# Patient Record
Sex: FEMALE | Race: WHITE | NOT HISPANIC OR LATINO | Employment: OTHER | URBAN - METROPOLITAN AREA
[De-identification: names, ages, dates, MRNs, and addresses within clinical notes are randomized per-mention and may not be internally consistent; named-entity substitution may affect disease eponyms.]

---

## 2023-02-09 ENCOUNTER — TELEPHONE (OUTPATIENT)
Dept: FAMILY MEDICINE CLINIC | Facility: CLINIC | Age: 63
End: 2023-02-09

## 2023-02-09 NOTE — TELEPHONE ENCOUNTER
Caller: SABIHA MCWILLIAMS    Best call back number: [phone]    Who is your current provider:N/A    Who would you like your new provider to be: FADI WOOD,  SABIHA OVIEDOCOMB SEES FADI MRN [id number]    What are your reasons for transferring care: DOES NOT HAVE A PCP    OKAY TO ACCEPT AS A NEW PATIENT?

## 2023-02-14 ENCOUNTER — PATIENT ROUNDING (BHMG ONLY) (OUTPATIENT)
Dept: FAMILY MEDICINE CLINIC | Facility: CLINIC | Age: 63
End: 2023-02-14
Payer: MEDICARE

## 2023-02-14 ENCOUNTER — OFFICE VISIT (OUTPATIENT)
Dept: FAMILY MEDICINE CLINIC | Facility: CLINIC | Age: 63
End: 2023-02-14
Payer: MEDICARE

## 2023-02-14 VITALS
WEIGHT: 197 LBS | BODY MASS INDEX: 29.18 KG/M2 | DIASTOLIC BLOOD PRESSURE: 82 MMHG | TEMPERATURE: 98.6 F | HEART RATE: 66 BPM | SYSTOLIC BLOOD PRESSURE: 140 MMHG | OXYGEN SATURATION: 96 % | HEIGHT: 69 IN

## 2023-02-14 DIAGNOSIS — Z12.11 COLON CANCER SCREENING: ICD-10-CM

## 2023-02-14 DIAGNOSIS — Z01.810 ENCOUNTER FOR PREPROCEDURAL CARDIOVASCULAR EXAMINATION: ICD-10-CM

## 2023-02-14 DIAGNOSIS — Z72.0 TOBACCO USE: Primary | ICD-10-CM

## 2023-02-14 DIAGNOSIS — R01.1 HEART MURMUR: ICD-10-CM

## 2023-02-14 DIAGNOSIS — F41.9 ANXIETY: ICD-10-CM

## 2023-02-14 DIAGNOSIS — Z13.9 ENCOUNTER FOR HEALTH-RELATED SCREENING: ICD-10-CM

## 2023-02-14 PROCEDURE — 99386 PREV VISIT NEW AGE 40-64: CPT | Performed by: NURSE PRACTITIONER

## 2023-02-14 NOTE — PROGRESS NOTES
February 14, 2023    Hello, may I speak with Tory Alston?    My name is Chelsey      I am  with [facility]  [address] [place] [address].    Before we get started may I verify your date of birth? 1960    I am calling to officially welcome you to our practice and ask about your recent visit. Is this a good time to talk? yes    Tell me about your visit with us. What things went well?  Very pleased with my visit. Margarita is a very nice lady.       We're always looking for ways to make our patients' experiences even better. Do you have recommendations on ways we may improve?  no    Overall were you satisfied with your first visit to our practice? Yes, absolutely!       I appreciate you taking the time to speak with me today. Is there anything else I can do for you? no      Thank you, and have a great day.
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
## 2023-02-14 NOTE — PATIENT INSTRUCTIONS
Mammogram  Cologuard this should come to your home  Pap should be scheduled.   Get us dates of covid   Shingles and tetanus vaccines are needed.   Follow up on lab results  Stop smoking

## 2023-02-15 ENCOUNTER — HOSPITAL ENCOUNTER (OUTPATIENT)
Dept: CARDIOLOGY | Facility: HOSPITAL | Age: 63
Discharge: HOME | End: 2023-02-15
Payer: MEDICARE

## 2023-02-15 ENCOUNTER — LAB (OUTPATIENT)
Dept: LAB | Facility: HOSPITAL | Age: 63
End: 2023-02-15
Payer: MEDICARE

## 2023-02-15 DIAGNOSIS — Z13.9 ENCOUNTER FOR HEALTH-RELATED SCREENING: ICD-10-CM

## 2023-02-15 DIAGNOSIS — R01.1 HEART MURMUR: ICD-10-CM

## 2023-02-15 DIAGNOSIS — Z01.810 ENCOUNTER FOR PREPROCEDURAL CARDIOVASCULAR EXAMINATION: ICD-10-CM

## 2023-02-15 DIAGNOSIS — F41.9 ANXIETY: ICD-10-CM

## 2023-02-15 LAB
ALBUMIN SERPL-MCNC: 4.4 G/DL (ref 3.5–5.2)
ALBUMIN/GLOB SERPL: 1.7 G/DL
ALP SERPL-CCNC: 78 U/L (ref 39–117)
ALT SERPL W P-5'-P-CCNC: 16 U/L (ref 1–33)
ANION GAP SERPL CALCULATED.3IONS-SCNC: 8.7 MMOL/L (ref 5–15)
AST SERPL-CCNC: 22 U/L (ref 1–32)
BASOPHILS # BLD AUTO: 0.09 10*3/MM3 (ref 0–0.2)
BASOPHILS NFR BLD AUTO: 1.4 % (ref 0–1.5)
BILIRUB SERPL-MCNC: 0.3 MG/DL (ref 0–1.2)
BUN SERPL-MCNC: 16 MG/DL (ref 8–23)
BUN/CREAT SERPL: 21.3 (ref 7–25)
CALCIUM SPEC-SCNC: 9.6 MG/DL (ref 8.6–10.5)
CHLORIDE SERPL-SCNC: 103 MMOL/L (ref 98–107)
CHOLEST SERPL-MCNC: 209 MG/DL (ref 0–200)
CO2 SERPL-SCNC: 25.3 MMOL/L (ref 22–29)
CREAT SERPL-MCNC: 0.75 MG/DL (ref 0.57–1)
DEPRECATED RDW RBC AUTO: 42.2 FL (ref 37–54)
EGFRCR SERPLBLD CKD-EPI 2021: 89 ML/MIN/1.73
EOSINOPHIL # BLD AUTO: 0.22 10*3/MM3 (ref 0–0.4)
EOSINOPHIL NFR BLD AUTO: 3.3 % (ref 0.3–6.2)
ERYTHROCYTE [DISTWIDTH] IN BLOOD BY AUTOMATED COUNT: 12.6 % (ref 12.3–15.4)
GLOBULIN UR ELPH-MCNC: 2.6 GM/DL
GLUCOSE SERPL-MCNC: 82 MG/DL (ref 65–99)
HCT VFR BLD AUTO: 40.8 % (ref 34–46.6)
HDLC SERPL-MCNC: 81 MG/DL (ref 40–60)
HGB BLD-MCNC: 13.6 G/DL (ref 12–15.9)
IMM GRANULOCYTES # BLD AUTO: 0.02 10*3/MM3 (ref 0–0.05)
IMM GRANULOCYTES NFR BLD AUTO: 0.3 % (ref 0–0.5)
LDLC SERPL CALC-MCNC: 115 MG/DL (ref 0–100)
LDLC/HDLC SERPL: 1.4 {RATIO}
LYMPHOCYTES # BLD AUTO: 2.17 10*3/MM3 (ref 0.7–3.1)
LYMPHOCYTES NFR BLD AUTO: 32.7 % (ref 19.6–45.3)
MCH RBC QN AUTO: 30.7 PG (ref 26.6–33)
MCHC RBC AUTO-ENTMCNC: 33.3 G/DL (ref 31.5–35.7)
MCV RBC AUTO: 92.1 FL (ref 79–97)
MONOCYTES # BLD AUTO: 0.49 10*3/MM3 (ref 0.1–0.9)
MONOCYTES NFR BLD AUTO: 7.4 % (ref 5–12)
NEUTROPHILS NFR BLD AUTO: 3.65 10*3/MM3 (ref 1.7–7)
NEUTROPHILS NFR BLD AUTO: 54.9 % (ref 42.7–76)
NRBC BLD AUTO-RTO: 0 /100 WBC (ref 0–0.2)
PLATELET # BLD AUTO: 255 10*3/MM3 (ref 140–450)
PMV BLD AUTO: 11.7 FL (ref 6–12)
POTASSIUM SERPL-SCNC: 4.3 MMOL/L (ref 3.5–5.2)
PROT SERPL-MCNC: 7 G/DL (ref 6–8.5)
RBC # BLD AUTO: 4.43 10*6/MM3 (ref 3.77–5.28)
SODIUM SERPL-SCNC: 137 MMOL/L (ref 136–145)
T4 FREE SERPL-MCNC: 1.04 NG/DL (ref 0.93–1.7)
TRIGL SERPL-MCNC: 73 MG/DL (ref 0–150)
TSH SERPL DL<=0.05 MIU/L-ACNC: 1.61 UIU/ML (ref 0.27–4.2)
VLDLC SERPL-MCNC: 13 MG/DL (ref 5–40)
WBC NRBC COR # BLD: 6.64 10*3/MM3 (ref 3.4–10.8)

## 2023-02-15 PROCEDURE — 93005 ELECTROCARDIOGRAM TRACING: CPT | Performed by: NURSE PRACTITIONER

## 2023-02-15 PROCEDURE — 84439 ASSAY OF FREE THYROXINE: CPT

## 2023-02-15 PROCEDURE — 80053 COMPREHEN METABOLIC PANEL: CPT

## 2023-02-15 PROCEDURE — 36415 COLL VENOUS BLD VENIPUNCTURE: CPT

## 2023-02-15 PROCEDURE — 93010 ELECTROCARDIOGRAM REPORT: CPT | Performed by: INTERNAL MEDICINE

## 2023-02-15 PROCEDURE — 80061 LIPID PANEL: CPT

## 2023-02-15 PROCEDURE — 85025 COMPLETE CBC W/AUTO DIFF WBC: CPT

## 2023-02-15 PROCEDURE — 84443 ASSAY THYROID STIM HORMONE: CPT

## 2023-02-17 LAB — QT INTERVAL: 425 MS

## 2023-02-22 ENCOUNTER — HOSPITAL ENCOUNTER (OUTPATIENT)
Dept: RESPIRATORY THERAPY | Facility: HOSPITAL | Age: 63
Discharge: HOME | End: 2023-02-22
Admitting: NURSE PRACTITIONER
Payer: MEDICARE

## 2023-02-22 DIAGNOSIS — Z01.810 ENCOUNTER FOR PREPROCEDURAL CARDIOVASCULAR EXAMINATION: ICD-10-CM

## 2023-02-22 DIAGNOSIS — R01.1 HEART MURMUR: ICD-10-CM

## 2023-02-22 LAB
BH CV STRESS BP STAGE 1: NORMAL
BH CV STRESS BP STAGE 2: NORMAL
BH CV STRESS DURATION MIN STAGE 1: 3
BH CV STRESS DURATION MIN STAGE 2: 3
BH CV STRESS DURATION SEC STAGE 1: 0
BH CV STRESS DURATION SEC STAGE 2: 0
BH CV STRESS GRADE STAGE 1: 10
BH CV STRESS GRADE STAGE 2: 0
BH CV STRESS HR STAGE 1: 152
BH CV STRESS HR STAGE 2: 160
BH CV STRESS METS STAGE 1: 5
BH CV STRESS METS STAGE 2: 7.5
BH CV STRESS PROTOCOL 1: NORMAL
BH CV STRESS RECOVERY BP: NORMAL MMHG
BH CV STRESS RECOVERY HR: 109 BPM
BH CV STRESS SPEED STAGE 1: 1.7
BH CV STRESS SPEED STAGE 2: 1.7
BH CV STRESS STAGE 1: 1
BH CV STRESS STAGE 2: 2
MAXIMAL PREDICTED HEART RATE: 156 BPM
PERCENT MAX PREDICTED HR: 105.77 %
STRESS BASELINE BP: NORMAL MMHG
STRESS BASELINE HR: 95 BPM
STRESS PERCENT HR: 124 %
STRESS POST ESTIMATED WORKLOAD: 7 METS
STRESS POST EXERCISE DUR MIN: 6 MIN
STRESS POST PEAK BP: NORMAL MMHG
STRESS POST PEAK HR: 165 BPM
STRESS TARGET HR: 133 BPM

## 2023-02-22 PROCEDURE — 93017 CV STRESS TEST TRACING ONLY: CPT

## 2023-02-22 PROCEDURE — 93018 CV STRESS TEST I&R ONLY: CPT | Performed by: INTERNAL MEDICINE

## 2023-02-24 ENCOUNTER — OFFICE VISIT (OUTPATIENT)
Dept: FAMILY MEDICINE CLINIC | Facility: CLINIC | Age: 63
End: 2023-02-24
Payer: MEDICARE

## 2023-02-24 VITALS
WEIGHT: 190 LBS | HEART RATE: 71 BPM | OXYGEN SATURATION: 93 % | SYSTOLIC BLOOD PRESSURE: 112 MMHG | BODY MASS INDEX: 28.14 KG/M2 | HEIGHT: 69 IN | DIASTOLIC BLOOD PRESSURE: 79 MMHG | TEMPERATURE: 97.8 F

## 2023-02-24 DIAGNOSIS — B37.0 THRUSH: Primary | ICD-10-CM

## 2023-02-24 PROCEDURE — 99213 OFFICE O/P EST LOW 20 MIN: CPT | Performed by: NURSE PRACTITIONER

## 2023-02-24 RX ORDER — CLOTRIMAZOLE 10 MG/1
10 LOZENGE ORAL; TOPICAL
Qty: 35 TABLET | Refills: 0 | Status: SHIPPED | OUTPATIENT
Start: 2023-02-24

## 2023-02-24 NOTE — PROGRESS NOTES
Subjective        Tory Alston is a 63 y.o. female.     Chief Complaint   Patient presents with   • Thrush     Coating on tongue       History of Present Illness  Patient is here for management of what she thinks is thrush started thurs evening. linda has this.   She is drinking from a bottle with cap that may be the problem  Is scraping her tongue. This helps no medicaitons.           The following portions of the patient's history were reviewed and updated as appropriate: allergies, current medications, past family history, past medical history, past social history, past surgical history and problem list.      Current Outpatient Medications:   •  clotrimazole (MYCELEX) 10 MG bethany, Take 1 tablet by mouth 5 (Five) Times a Day., Disp: 35 tablet, Rfl: 0    Recent Results (from the past 4032 hour(s))   Comprehensive Metabolic Panel    Collection Time: 02/15/23  9:01 AM    Specimen: Blood   Result Value Ref Range    Glucose 82 65 - 99 mg/dL    BUN 16 8 - 23 mg/dL    Creatinine 0.75 0.57 - 1.00 mg/dL    Sodium 137 136 - 145 mmol/L    Potassium 4.3 3.5 - 5.2 mmol/L    Chloride 103 98 - 107 mmol/L    CO2 25.3 22.0 - 29.0 mmol/L    Calcium 9.6 8.6 - 10.5 mg/dL    Total Protein 7.0 6.0 - 8.5 g/dL    Albumin 4.4 3.5 - 5.2 g/dL    ALT (SGPT) 16 1 - 33 U/L    AST (SGOT) 22 1 - 32 U/L    Alkaline Phosphatase 78 39 - 117 U/L    Total Bilirubin 0.3 0.0 - 1.2 mg/dL    Globulin 2.6 gm/dL    A/G Ratio 1.7 g/dL    BUN/Creatinine Ratio 21.3 7.0 - 25.0    Anion Gap 8.7 5.0 - 15.0 mmol/L    eGFR 89.0 >60.0 mL/min/1.73   Lipid Panel    Collection Time: 02/15/23  9:01 AM    Specimen: Blood   Result Value Ref Range    Total Cholesterol 209 (H) 0 - 200 mg/dL    Triglycerides 73 0 - 150 mg/dL    HDL Cholesterol 81 (H) 40 - 60 mg/dL    LDL Cholesterol  115 (H) 0 - 100 mg/dL    VLDL Cholesterol 13 5 - 40 mg/dL    LDL/HDL Ratio 1.40    TSH    Collection Time: 02/15/23  9:01 AM    Specimen: Blood   Result Value Ref Range    TSH 1.610  0.270 - 4.200 uIU/mL   T4, free    Collection Time: 02/15/23  9:01 AM    Specimen: Blood   Result Value Ref Range    Free T4 1.04 0.93 - 1.70 ng/dL   CBC Auto Differential    Collection Time: 02/15/23  9:01 AM    Specimen: Blood   Result Value Ref Range    WBC 6.64 3.40 - 10.80 10*3/mm3    RBC 4.43 3.77 - 5.28 10*6/mm3    Hemoglobin 13.6 12.0 - 15.9 g/dL    Hematocrit 40.8 34.0 - 46.6 %    MCV 92.1 79.0 - 97.0 fL    MCH 30.7 26.6 - 33.0 pg    MCHC 33.3 31.5 - 35.7 g/dL    RDW 12.6 12.3 - 15.4 %    RDW-SD 42.2 37.0 - 54.0 fl    MPV 11.7 6.0 - 12.0 fL    Platelets 255 140 - 450 10*3/mm3    Neutrophil % 54.9 42.7 - 76.0 %    Lymphocyte % 32.7 19.6 - 45.3 %    Monocyte % 7.4 5.0 - 12.0 %    Eosinophil % 3.3 0.3 - 6.2 %    Basophil % 1.4 0.0 - 1.5 %    Immature Grans % 0.3 0.0 - 0.5 %    Neutrophils, Absolute 3.65 1.70 - 7.00 10*3/mm3    Lymphocytes, Absolute 2.17 0.70 - 3.10 10*3/mm3    Monocytes, Absolute 0.49 0.10 - 0.90 10*3/mm3    Eosinophils, Absolute 0.22 0.00 - 0.40 10*3/mm3    Basophils, Absolute 0.09 0.00 - 0.20 10*3/mm3    Immature Grans, Absolute 0.02 0.00 - 0.05 10*3/mm3    nRBC 0.0 0.0 - 0.2 /100 WBC   ECG 12 Lead    Collection Time: 02/15/23  9:18 AM   Result Value Ref Range    QT Interval 425 ms   Treadmill Stress Test    Collection Time: 02/22/23 10:39 AM   Result Value Ref Range    Target HR (85%) 133 bpm    Max. Pred. HR (100%) 156 bpm     CV STRESS PROTOCOL 1 Kai     Stage 1 1     HR Stage 1 152     BP Stage 1 160/90     Duration Min Stage 1 3     Duration Sec Stage 1 0     Grade Stage 1 10     Speed Stage 1 1.7     BH CV STRESS METS STAGE 1 5     Stage 2 2     HR Stage 2 160     BP Stage 2 175/88     Duration Min Stage 2 3     Duration Sec Stage 2 0     Grade Stage 2 0     Speed Stage 2 1.7     BH CV STRESS METS STAGE 2 7.5     Baseline HR 95 bpm    Baseline /75 mmHg    Peak  bpm    Percent Max Pred .77 %    Percent Target  %    Peak /88 mmHg    Exercise duration  "(min) 6 min    Estimated workload 7.0 METS    Recovery  bpm    Recovery /76 mmHg         Review of Systems    Objective     /79 (BP Location: Left arm, Patient Position: Sitting, Cuff Size: Adult)   Pulse 71   Temp 97.8 °F (36.6 °C) (Infrared)   Ht 175.3 cm (69\")   Wt 86.2 kg (190 lb)   SpO2 93%   BMI 28.06 kg/m²     Physical Exam  Vitals and nursing note reviewed.   Constitutional:       Appearance: Normal appearance.   HENT:      Mouth/Throat:      Comments: White coating to tongue.   Cardiovascular:      Rate and Rhythm: Regular rhythm.   Neurological:      General: No focal deficit present.      Mental Status: She is alert.         Result Review :                Assessment & Plan    Diagnoses and all orders for this visit:    1. Thrush (Primary)  Comments:  RX sent to pharmacy    Other orders  -     clotrimazole (MYCELEX) 10 MG bethany; Take 1 tablet by mouth 5 (Five) Times a Day.  Dispense: 35 tablet; Refill: 0      There are no Patient Instructions on file for this visit.    Follow Up   No follow-ups on file.    Patient was given instructions and counseling regarding her condition or for health maintenance advice. Please see specific information pulled into the AVS if appropriate.     Margarita Erazo, APRN    02/24/23      "

## 2023-03-06 DIAGNOSIS — R19.5 POSITIVE COLORECTAL CANCER SCREENING USING COLOGUARD TEST: Primary | ICD-10-CM

## 2023-03-10 ENCOUNTER — HOSPITAL ENCOUNTER (OUTPATIENT)
Dept: CARDIOLOGY | Facility: HOSPITAL | Age: 63
Discharge: HOME | End: 2023-03-10
Admitting: NURSE PRACTITIONER
Payer: MEDICARE

## 2023-03-10 DIAGNOSIS — R01.1 HEART MURMUR: ICD-10-CM

## 2023-03-10 PROCEDURE — 93306 TTE W/DOPPLER COMPLETE: CPT | Performed by: INTERNAL MEDICINE

## 2023-03-10 PROCEDURE — 93306 TTE W/DOPPLER COMPLETE: CPT

## 2023-03-13 LAB
BH CV ECHO MEAS - ACS: 2 CM
BH CV ECHO MEAS - AO MAX PG: 8.2 MMHG
BH CV ECHO MEAS - AO MEAN PG: 4 MMHG
BH CV ECHO MEAS - AO V2 MAX: 143 CM/SEC
BH CV ECHO MEAS - AO V2 VTI: 26.7 CM
BH CV ECHO MEAS - AVA(I,D): 3 CM2
BH CV ECHO MEAS - EDV(CUBED): 79.5 ML
BH CV ECHO MEAS - EDV(MOD-SP4): 120 ML
BH CV ECHO MEAS - EF(MOD-BP): 69 %
BH CV ECHO MEAS - EF(MOD-SP4): 69.3 %
BH CV ECHO MEAS - EF_3D-VOL: 71 %
BH CV ECHO MEAS - ESV(CUBED): 19.7 ML
BH CV ECHO MEAS - ESV(MOD-SP4): 36.9 ML
BH CV ECHO MEAS - FS: 37.2 %
BH CV ECHO MEAS - IVS/LVPW: 1 CM
BH CV ECHO MEAS - IVSD: 0.9 CM
BH CV ECHO MEAS - LA DIMENSION: 3.2 CM
BH CV ECHO MEAS - LAT PEAK E' VEL: 12.5 CM/SEC
BH CV ECHO MEAS - LV DIASTOLIC VOL/BSA (35-75): 59.4 CM2
BH CV ECHO MEAS - LV MASS(C)D: 123.3 GRAMS
BH CV ECHO MEAS - LV MAX PG: 7.5 MMHG
BH CV ECHO MEAS - LV MEAN PG: 4 MMHG
BH CV ECHO MEAS - LV SYSTOLIC VOL/BSA (12-30): 18.3 CM2
BH CV ECHO MEAS - LV V1 MAX: 137 CM/SEC
BH CV ECHO MEAS - LV V1 VTI: 25.1 CM
BH CV ECHO MEAS - LVIDD: 4.3 CM
BH CV ECHO MEAS - LVIDS: 2.7 CM
BH CV ECHO MEAS - LVOT AREA: 3.1 CM2
BH CV ECHO MEAS - LVOT DIAM: 2 CM
BH CV ECHO MEAS - LVPWD: 0.9 CM
BH CV ECHO MEAS - MED PEAK E' VEL: 9.7 CM/SEC
BH CV ECHO MEAS - MV A MAX VEL: 81.5 CM/SEC
BH CV ECHO MEAS - MV DEC SLOPE: 163 CM/SEC2
BH CV ECHO MEAS - MV DEC TIME: 0.2 MSEC
BH CV ECHO MEAS - MV E MAX VEL: 68.2 CM/SEC
BH CV ECHO MEAS - MV E/A: 0.84
BH CV ECHO MEAS - MV MAX PG: 3.4 MMHG
BH CV ECHO MEAS - MV MEAN PG: 2 MMHG
BH CV ECHO MEAS - MV P1/2T: 153.6 MSEC
BH CV ECHO MEAS - MV V2 VTI: 30 CM
BH CV ECHO MEAS - MVA(P1/2T): 1.43 CM2
BH CV ECHO MEAS - MVA(VTI): 2.6 CM2
BH CV ECHO MEAS - PA ACC TIME: 0.17 SEC
BH CV ECHO MEAS - PA PR(ACCEL): 0.7 MMHG
BH CV ECHO MEAS - PA V2 MAX: 101.2 CM/SEC
BH CV ECHO MEAS - RV MAX PG: 2.07 MMHG
BH CV ECHO MEAS - RV V1 MAX: 71.9 CM/SEC
BH CV ECHO MEAS - RV V1 VTI: 14.1 CM
BH CV ECHO MEAS - RVDD: 3.9 CM
BH CV ECHO MEAS - SI(MOD-SP4): 41.1 ML/M2
BH CV ECHO MEAS - SV(LVOT): 78.9 ML
BH CV ECHO MEAS - SV(MOD-SP4): 83.1 ML
BH CV ECHO MEAS - TAPSE (>1.6): 2 CM
BH CV ECHO MEAS - TR MAX PG: 18.1 MMHG
BH CV ECHO MEAS - TR MAX VEL: 213 CM/SEC
BH CV ECHO MEASUREMENTS AVERAGE E/E' RATIO: 6.14
BH CV XLRA - TDI S': 12.5 CM/SEC
LEFT ATRIUM VOLUME INDEX: 16.3 ML/M2
MAXIMAL PREDICTED HEART RATE: 156 BPM
SINUS: 3 CM
STJ: 2.1 CM
STRESS TARGET HR: 133 BPM

## 2023-03-14 ENCOUNTER — OFFICE VISIT (OUTPATIENT)
Dept: FAMILY MEDICINE CLINIC | Facility: CLINIC | Age: 63
End: 2023-03-14
Payer: MEDICARE

## 2023-03-14 VITALS
SYSTOLIC BLOOD PRESSURE: 130 MMHG | DIASTOLIC BLOOD PRESSURE: 72 MMHG | HEIGHT: 69 IN | TEMPERATURE: 96.6 F | OXYGEN SATURATION: 96 % | WEIGHT: 186.8 LBS | BODY MASS INDEX: 27.67 KG/M2 | RESPIRATION RATE: 20 BRPM | HEART RATE: 74 BPM

## 2023-03-14 DIAGNOSIS — Z72.0 TOBACCO USE: Chronic | ICD-10-CM

## 2023-03-14 DIAGNOSIS — E78.2 MIXED HYPERLIPIDEMIA: Primary | ICD-10-CM

## 2023-03-14 DIAGNOSIS — R01.1 HEART MURMUR: Chronic | ICD-10-CM

## 2023-03-14 DIAGNOSIS — R19.5 POSITIVE COLORECTAL CANCER SCREENING USING COLOGUARD TEST: Chronic | ICD-10-CM

## 2023-03-14 PROBLEM — B37.0 THRUSH: Status: RESOLVED | Noted: 2023-02-24 | Resolved: 2023-03-14

## 2023-03-14 PROBLEM — D36.9 PAPILLOMA: Status: ACTIVE | Noted: 2023-03-03

## 2023-03-14 PROCEDURE — 99214 OFFICE O/P EST MOD 30 MIN: CPT | Performed by: NURSE PRACTITIONER

## 2023-03-14 PROCEDURE — 90677 PCV20 VACCINE IM: CPT | Performed by: NURSE PRACTITIONER

## 2023-03-14 PROCEDURE — 1159F MED LIST DOCD IN RCRD: CPT | Performed by: NURSE PRACTITIONER

## 2023-03-14 PROCEDURE — G0009 ADMIN PNEUMOCOCCAL VACCINE: HCPCS | Performed by: NURSE PRACTITIONER

## 2023-03-14 PROCEDURE — 1160F RVW MEDS BY RX/DR IN RCRD: CPT | Performed by: NURSE PRACTITIONER

## 2023-03-14 RX ORDER — ATORVASTATIN CALCIUM 40 MG/1
40 TABLET, FILM COATED ORAL DAILY
Qty: 90 TABLET | Refills: 0 | Status: SHIPPED | OUTPATIENT
Start: 2023-03-14

## 2023-03-14 NOTE — PROGRESS NOTES
Subjective        Tory Alston is a 63 y.o. female.     Chief Complaint   Patient presents with   • Hypertension   • CARDIAC Testing follow up       History of Present Illness  Patient is here for management of her hypertension, hyperlipidemia, heart murmur.     Heart murmur; no completed her stress test : 2/22/2023  Good exercise tolerance no evidence for myocardial ischemia.  Echo EF left ventricular 69%  Left ventricular diastolic function consistent with grade 1 impaired relaxation.   No significant valvular abnormalities.    Tobacco use she will need pneumonia vaccine.     ACSD: risk calculator 9.5% risk score: recommended she take atorvastatin start 40 mg daily.     Positive cologuard she is filling out paper for [facility] to perform colon cancer screening           The following portions of the patient's history were reviewed and updated as appropriate: allergies, current medications, past family history, past medical history, past social history, past surgical history and problem list.      Current Outpatient Medications:   •  clotrimazole (MYCELEX) 10 MG bethany, Take 1 tablet by mouth 5 (Five) Times a Day., Disp: 35 tablet, Rfl: 0  •  atorvastatin (Lipitor) 40 MG tablet, Take 1 tablet by mouth Daily., Disp: 90 tablet, Rfl: 0    Recent Results (from the past 4032 hour(s))   Comprehensive Metabolic Panel    Collection Time: 02/15/23  9:01 AM    Specimen: Blood   Result Value Ref Range    Glucose 82 65 - 99 mg/dL    BUN 16 8 - 23 mg/dL    Creatinine 0.75 0.57 - 1.00 mg/dL    Sodium 137 136 - 145 mmol/L    Potassium 4.3 3.5 - 5.2 mmol/L    Chloride 103 98 - 107 mmol/L    CO2 25.3 22.0 - 29.0 mmol/L    Calcium 9.6 8.6 - 10.5 mg/dL    Total Protein 7.0 6.0 - 8.5 g/dL    Albumin 4.4 3.5 - 5.2 g/dL    ALT (SGPT) 16 1 - 33 U/L    AST (SGOT) 22 1 - 32 U/L    Alkaline Phosphatase 78 39 - 117 U/L    Total Bilirubin 0.3 0.0 - 1.2 mg/dL    Globulin 2.6 gm/dL    A/G Ratio 1.7 g/dL    BUN/Creatinine Ratio 21.3 7.0 - 25.0     Anion Gap 8.7 5.0 - 15.0 mmol/L    eGFR 89.0 >60.0 mL/min/1.73   Lipid Panel    Collection Time: 02/15/23  9:01 AM    Specimen: Blood   Result Value Ref Range    Total Cholesterol 209 (H) 0 - 200 mg/dL    Triglycerides 73 0 - 150 mg/dL    HDL Cholesterol 81 (H) 40 - 60 mg/dL    LDL Cholesterol  115 (H) 0 - 100 mg/dL    VLDL Cholesterol 13 5 - 40 mg/dL    LDL/HDL Ratio 1.40    TSH    Collection Time: 02/15/23  9:01 AM    Specimen: Blood   Result Value Ref Range    TSH 1.610 0.270 - 4.200 uIU/mL   T4, free    Collection Time: 02/15/23  9:01 AM    Specimen: Blood   Result Value Ref Range    Free T4 1.04 0.93 - 1.70 ng/dL   CBC Auto Differential    Collection Time: 02/15/23  9:01 AM    Specimen: Blood   Result Value Ref Range    WBC 6.64 3.40 - 10.80 10*3/mm3    RBC 4.43 3.77 - 5.28 10*6/mm3    Hemoglobin 13.6 12.0 - 15.9 g/dL    Hematocrit 40.8 34.0 - 46.6 %    MCV 92.1 79.0 - 97.0 fL    MCH 30.7 26.6 - 33.0 pg    MCHC 33.3 31.5 - 35.7 g/dL    RDW 12.6 12.3 - 15.4 %    RDW-SD 42.2 37.0 - 54.0 fl    MPV 11.7 6.0 - 12.0 fL    Platelets 255 140 - 450 10*3/mm3    Neutrophil % 54.9 42.7 - 76.0 %    Lymphocyte % 32.7 19.6 - 45.3 %    Monocyte % 7.4 5.0 - 12.0 %    Eosinophil % 3.3 0.3 - 6.2 %    Basophil % 1.4 0.0 - 1.5 %    Immature Grans % 0.3 0.0 - 0.5 %    Neutrophils, Absolute 3.65 1.70 - 7.00 10*3/mm3    Lymphocytes, Absolute 2.17 0.70 - 3.10 10*3/mm3    Monocytes, Absolute 0.49 0.10 - 0.90 10*3/mm3    Eosinophils, Absolute 0.22 0.00 - 0.40 10*3/mm3    Basophils, Absolute 0.09 0.00 - 0.20 10*3/mm3    Immature Grans, Absolute 0.02 0.00 - 0.05 10*3/mm3    nRBC 0.0 0.0 - 0.2 /100 WBC   ECG 12 Lead    Collection Time: 02/15/23  9:18 AM   Result Value Ref Range    QT Interval 425 ms   Treadmill Stress Test    Collection Time: 02/22/23 10:39 AM   Result Value Ref Range    Target HR (85%) 133 bpm    Max. Pred. HR (100%) 156 bpm     CV STRESS PROTOCOL 1 Kai     Stage 1 1     HR Stage 1 152     BP Stage 1 160/90      Duration Min Stage 1 3     Duration Sec Stage 1 0     Grade Stage 1 10     Speed Stage 1 1.7     BH CV STRESS METS STAGE 1 5     Stage 2 2     HR Stage 2 160     BP Stage 2 175/88     Duration Min Stage 2 3     Duration Sec Stage 2 0     Grade Stage 2 0     Speed Stage 2 1.7     BH CV STRESS METS STAGE 2 7.5     Baseline HR 95 bpm    Baseline /75 mmHg    Peak  bpm    Percent Max Pred .77 %    Percent Target  %    Peak /88 mmHg    Exercise duration (min) 6 min    Estimated workload 7.0 METS    Recovery  bpm    Recovery /76 mmHg   Cologuard - Stool, Per Rectum    Collection Time: 02/27/23  7:24 AM    Specimen: Per Rectum; Stool   Result Value Ref Range    Cologuard Positive (A) Negative   Adult Transthoracic Echo Complete W/ Cont if Necessary Per Protocol    Collection Time: 03/10/23  4:50 PM   Result Value Ref Range    Target HR (85%) 133 bpm    Max. Pred. HR (100%) 156 bpm    LVIDd 4.3 cm    LVIDs 2.7 cm    IVSd 0.90 cm    LVPWd 0.90 cm    FS 37.2 %    IVS/LVPW 1.00 cm    ESV(cubed) 19.7 ml    LV Sys Vol (BSA corrected) 18.3 cm2    EDV(cubed) 79.5 ml    LV Conley Vol (BSA corrected) 59.4 cm2    LVOT area 3.1 cm2    LV mass(C)d 123.3 grams    LVOT diam 2.00 cm    EDV(MOD-sp4) 120.0 ml    ESV(MOD-sp4) 36.9 ml    SV(MOD-sp4) 83.1 ml    SI(MOD-sp4) 41.1 ml/m2    EF(MOD-sp4) 69.3 %    MV E max erickson 68.2 cm/sec    MV A max erickson 81.5 cm/sec    MV dec time 0.20 msec    MV E/A 0.84     LA ESV Index (BP) 16.3 ml/m2    Med Peak E' Erickson 9.7 cm/sec    Lat Peak E' Erickson 12.5 cm/sec    Avg E/e' ratio 6.14     SV(LVOT) 78.9 ml    RVIDd 3.9 cm    TAPSE (>1.6) 2.00 cm    RV S' 12.5 cm/sec    LA dimension (2D)  3.2 cm    LV V1 max 137.0 cm/sec    LV V1 max PG 7.5 mmHg    LV V1 mean PG 4.0 mmHg    LV V1 VTI 25.1 cm    Ao pk erickson 143.0 cm/sec    Ao max PG 8.2 mmHg    Ao mean PG 4.0 mmHg    Ao V2 VTI 26.7 cm    SAHIL(I,D) 3.0 cm2    MV max PG 3.4 mmHg    MV mean PG 2.00 mmHg    MV V2 VTI 30.0 cm    MV  "P1/2t 153.6 msec    MVA(P1/2t) 1.43 cm2    MVA(VTI) 2.6 cm2    MV dec slope 163.0 cm/sec2    TR max chetan 213.0 cm/sec    TR max PG 18.1 mmHg    RV V1 max PG 2.07 mmHg    RV V1 max 71.9 cm/sec    RV V1 VTI 14.1 cm    PA V2 max 101.2 cm/sec    PA acc time 0.17 sec    PA pr(Accel) 0.70 mmHg    ACS 2.00 cm    Sinus 3.0 cm    STJ 2.10 cm    EF(MOD-bp) 69.0 %    EF_3D-VOL 71.0 %         Review of Systems    Objective     /72   Pulse 74   Temp 96.6 °F (35.9 °C) (Infrared)   Resp 20   Ht 175.3 cm (69\")   Wt 84.7 kg (186 lb 12.8 oz)   SpO2 96%   BMI 27.59 kg/m²     Physical Exam  Vitals and nursing note reviewed.   HENT:      Head: Normocephalic.      Right Ear: External ear normal.      Left Ear: External ear normal.      Nose: Nose normal.      Mouth/Throat:      Mouth: Mucous membranes are moist.   Eyes:      Pupils: Pupils are equal, round, and reactive to light.   Cardiovascular:      Rate and Rhythm: Normal rate and regular rhythm.      Heart sounds: Murmur heard.   Pulmonary:      Effort: Pulmonary effort is normal.      Breath sounds: Normal breath sounds.   Abdominal:      Palpations: Abdomen is soft.   Musculoskeletal:      Cervical back: Neck supple.   Skin:     General: Skin is warm.   Neurological:      General: No focal deficit present.      Mental Status: She is alert.   Psychiatric:         Mood and Affect: Mood normal.         Thought Content: Thought content normal.         Result Review :                Assessment & Plan    Diagnoses and all orders for this visit:    1. Mixed hyperlipidemia (Primary)  Comments:  start statin    2. Tobacco use  Comments:  recommend she stop smoking she is cutting back    3. Positive colorectal cancer screening using Cologuard test  Comments:  working on paperwork for colonoscopy    4. Heart murmur  Comments:  testing completed reviewed in office today.     Other orders  -     atorvastatin (Lipitor) 40 MG tablet; Take 1 tablet by mouth Daily.  Dispense: 90 " tablet; Refill: 0  -     Pneumococcal Conjugate Vaccine 20-Valent (PCV20)      Patient Instructions   Monitor blood pressure  Stop smoking  Eat healthy exercise  Start your statin       Follow Up   Return in about 6 months (around 9/14/2023).    Patient was given instructions and counseling regarding her condition or for health maintenance advice. Please see specific information pulled into the AVS if appropriate.     Margarita Erazo, APRN    03/14/23      Answers for HPI/ROS submitted by the patient on 3/7/2023  Please describe your symptoms.: Test results  Have you had these symptoms before?: No  How long have you been having these symptoms?: Greater than 2 weeks  Please list any medications you are currently taking for this condition.: None  Please describe any probable cause for these symptoms. : Test results  What is the primary reason for your visit?: Other

## 2023-06-10 RX ORDER — ATORVASTATIN CALCIUM 40 MG/1
TABLET, FILM COATED ORAL
Qty: 90 TABLET | Refills: 0 | Status: SHIPPED | OUTPATIENT
Start: 2023-06-10

## 2023-06-29 ENCOUNTER — OFFICE (OUTPATIENT)
Dept: URBAN - METROPOLITAN AREA PATHOLOGY 4 | Facility: PATHOLOGY | Age: 63
End: 2023-06-29

## 2023-06-29 ENCOUNTER — OFFICE (OUTPATIENT)
Dept: URBAN - METROPOLITAN AREA PATHOLOGY 4 | Facility: PATHOLOGY | Age: 63
End: 2023-06-29
Payer: COMMERCIAL

## 2023-06-29 ENCOUNTER — ON CAMPUS - OUTPATIENT (OUTPATIENT)
Dept: URBAN - METROPOLITAN AREA HOSPITAL 2 | Facility: HOSPITAL | Age: 63
End: 2023-06-29
Payer: MEDICARE

## 2023-06-29 VITALS
HEART RATE: 71 BPM | OXYGEN SATURATION: 98 % | HEART RATE: 67 BPM | RESPIRATION RATE: 16 BRPM | DIASTOLIC BLOOD PRESSURE: 65 MMHG | DIASTOLIC BLOOD PRESSURE: 86 MMHG | SYSTOLIC BLOOD PRESSURE: 115 MMHG | OXYGEN SATURATION: 99 % | HEART RATE: 74 BPM | RESPIRATION RATE: 17 BRPM | OXYGEN SATURATION: 96 % | RESPIRATION RATE: 14 BRPM | RESPIRATION RATE: 12 BRPM | HEIGHT: 69 IN | DIASTOLIC BLOOD PRESSURE: 69 MMHG | SYSTOLIC BLOOD PRESSURE: 143 MMHG | OXYGEN SATURATION: 100 % | DIASTOLIC BLOOD PRESSURE: 72 MMHG | SYSTOLIC BLOOD PRESSURE: 117 MMHG | DIASTOLIC BLOOD PRESSURE: 73 MMHG | SYSTOLIC BLOOD PRESSURE: 120 MMHG | SYSTOLIC BLOOD PRESSURE: 136 MMHG | SYSTOLIC BLOOD PRESSURE: 108 MMHG | HEART RATE: 89 BPM | SYSTOLIC BLOOD PRESSURE: 137 MMHG | TEMPERATURE: 97.8 F | RESPIRATION RATE: 13 BRPM | WEIGHT: 183 LBS | SYSTOLIC BLOOD PRESSURE: 99 MMHG | DIASTOLIC BLOOD PRESSURE: 93 MMHG | DIASTOLIC BLOOD PRESSURE: 83 MMHG | DIASTOLIC BLOOD PRESSURE: 89 MMHG | HEART RATE: 76 BPM | RESPIRATION RATE: 18 BRPM

## 2023-06-29 DIAGNOSIS — D12.0 BENIGN NEOPLASM OF CECUM: ICD-10-CM

## 2023-06-29 DIAGNOSIS — Z12.11 ENCOUNTER FOR SCREENING FOR MALIGNANT NEOPLASM OF COLON: ICD-10-CM

## 2023-06-29 DIAGNOSIS — K62.1 RECTAL POLYP: ICD-10-CM

## 2023-06-29 DIAGNOSIS — K64.1 SECOND DEGREE HEMORRHOIDS: ICD-10-CM

## 2023-06-29 DIAGNOSIS — R19.5 OTHER FECAL ABNORMALITIES: ICD-10-CM

## 2023-06-29 DIAGNOSIS — D12.1 BENIGN NEOPLASM OF APPENDIX: ICD-10-CM

## 2023-06-29 DIAGNOSIS — K57.30 DIVERTICULOSIS OF LARGE INTESTINE WITHOUT PERFORATION OR ABS: ICD-10-CM

## 2023-06-29 PROBLEM — K63.5 POLYP OF COLON: Status: ACTIVE | Noted: 2023-06-29

## 2023-06-29 LAB
GI HISTOLOGY: A. UNSPECIFIED: (no result)
GI HISTOLOGY: B. UNSPECIFIED: (no result)
GI HISTOLOGY: PDF REPORT: (no result)

## 2023-06-29 PROCEDURE — 45385 COLONOSCOPY W/LESION REMOVAL: CPT | Mod: PT | Performed by: INTERNAL MEDICINE

## 2023-06-29 PROCEDURE — 88305 TISSUE EXAM BY PATHOLOGIST: CPT | Mod: 26 | Performed by: INTERNAL MEDICINE

## 2023-06-29 RX ADMIN — ONDANSETRON HYDROCHLORIDE 4 MG: 4 TABLET, FILM COATED ORAL at 10:09

## 2023-09-06 RX ORDER — ATORVASTATIN CALCIUM 40 MG/1
TABLET, FILM COATED ORAL
Qty: 90 TABLET | Refills: 0 | Status: SHIPPED | OUTPATIENT
Start: 2023-09-06

## 2023-09-14 ENCOUNTER — OFFICE VISIT (OUTPATIENT)
Dept: FAMILY MEDICINE CLINIC | Facility: CLINIC | Age: 63
End: 2023-09-14
Payer: MEDICARE

## 2023-09-14 VITALS
OXYGEN SATURATION: 96 % | HEART RATE: 67 BPM | DIASTOLIC BLOOD PRESSURE: 72 MMHG | BODY MASS INDEX: 27.11 KG/M2 | WEIGHT: 183 LBS | HEIGHT: 69 IN | TEMPERATURE: 98.6 F | SYSTOLIC BLOOD PRESSURE: 110 MMHG

## 2023-09-14 DIAGNOSIS — Z11.59 NEED FOR HEPATITIS C SCREENING TEST: ICD-10-CM

## 2023-09-14 DIAGNOSIS — F43.10 POSTTRAUMATIC STRESS DISORDER: Chronic | ICD-10-CM

## 2023-09-14 DIAGNOSIS — E78.2 MIXED HYPERLIPIDEMIA: Chronic | ICD-10-CM

## 2023-09-14 DIAGNOSIS — F41.9 ANXIETY: Primary | ICD-10-CM

## 2023-09-14 DIAGNOSIS — Z72.0 TOBACCO USE: Chronic | ICD-10-CM

## 2023-09-14 PROBLEM — R19.5 OTHER FECAL ABNORMALITIES: Status: ACTIVE | Noted: 2023-06-29

## 2023-09-14 PROBLEM — F32.9 MAJOR DEPRESSIVE DISORDER, SINGLE EPISODE, UNSPECIFIED: Status: ACTIVE | Noted: 2023-09-14

## 2023-09-14 PROBLEM — E78.00 PURE HYPERCHOLESTEROLEMIA: Status: ACTIVE | Noted: 2023-09-14

## 2023-09-14 PROBLEM — M54.30 SCIATICA: Status: ACTIVE | Noted: 2023-05-10

## 2023-09-14 PROBLEM — K63.5 POLYP OF COLON: Status: ACTIVE | Noted: 2023-06-29

## 2023-09-14 PROBLEM — K57.30 DIVERTICULOSIS OF LARGE INTESTINE WITHOUT PERFORATION OR ABSCESS WITHOUT BLEEDING: Status: ACTIVE | Noted: 2023-06-29

## 2023-09-14 NOTE — PROGRESS NOTES
"Subjective        Tory Alston is a 63 y.o. female.     Chief Complaint   Patient presents with    Medicare Wellness-Initial Visit     Initial mcw    Hyperlipidemia     6 month f/u       History of Present Illness    The following portions of the patient's history were reviewed and updated as appropriate: allergies, current medications, past family history, past medical history, past social history, past surgical history and problem list.      Current Outpatient Medications:     atorvastatin (LIPITOR) 40 MG tablet, TAKE 1 TABLET BY MOUTH EVERY DAY, Disp: 90 tablet, Rfl: 0    clotrimazole (MYCELEX) 10 MG bethany, Take 1 tablet by mouth 5 (Five) Times a Day., Disp: 35 tablet, Rfl: 0    No results found for this or any previous visit (from the past 4032 hour(s)).      Review of Systems    Objective     Pulse 67   Temp 98.6 °F (37 °C) (Infrared)   Ht 175.3 cm (69\")   Wt 83 kg (183 lb)   SpO2 96%   BMI 27.02 kg/m²     Physical Exam    Result Review :                Assessment & Plan    There are no diagnoses linked to this encounter.  There are no Patient Instructions on file for this visit.    Follow Up   No follow-ups on file.    Patient was given instructions and counseling regarding her condition or for health maintenance advice. Please see specific information pulled into the AVS if appropriate.     Margarita Erazo, APRN    09/14/23      "

## 2023-09-14 NOTE — PROGRESS NOTES
The ABCs of the Annual Wellness Visit  Initial Medicare Wellness Visit    Subjective     Tory Alston is a 63 y.o. female who presents for an Initial Medicare Wellness Visit.    The following portions of the patient's history were reviewed and   updated as appropriate: allergies, current medications, past family history, past medical history, past social history, past surgical history, and problem list.     Compared to one year ago, the patient feels her physical   health is better.    Compared to one year ago, the patient feels her mental   health is the same.    Recent Hospitalizations:  She was not admitted to the hospital during the last year.       Current Medical Providers:  Patient Care Team:  Margarita Erazo APRN as PCP - General (Nurse Practitioner)    Outpatient Medications Prior to Visit   Medication Sig Dispense Refill    atorvastatin (LIPITOR) 40 MG tablet TAKE 1 TABLET BY MOUTH EVERY DAY 90 tablet 0    clotrimazole (MYCELEX) 10 MG bethany Take 1 tablet by mouth 5 (Five) Times a Day. 35 tablet 0     No facility-administered medications prior to visit.       No opioid medication identified on active medication list. I have reviewed chart for other potential  high risk medication/s and harmful drug interactions in the elderly.        Aspirin is not on active medication list.  Aspirin use is not indicated based on review of current medical condition/s. Risk of harm outweighs potential benefits.  .    Patient Active Problem List   Diagnosis    Tobacco use    Encounter for health-related screening    Anxiety    Papilloma    Mixed hyperlipidemia    Positive colorectal cancer screening using Cologuard test    Heart murmur    Diverticulosis of large intestine without perforation or abscess without bleeding    Major depressive disorder, single episode, unspecified    Other fecal abnormalities    Posttraumatic stress disorder    Pure hypercholesterolemia    Polyp of colon    Sciatica     Advance Care Planning  "  Advance Care Planning     Advance Directive is not on file.  ACP discussion was held with the patient during this visit. Patient does not have an advance directive, information provided.       Objective    Vitals:    23 0825 23 09   BP:  110/72   Pulse: 67    Temp: 98.6 °F (37 °C)    TempSrc: Infrared    SpO2: 96%    Weight: 83 kg (183 lb)    Height: 175.3 cm (69\")      Estimated body mass index is 27.02 kg/m² as calculated from the following:    Height as of this encounter: 175.3 cm (69\").    Weight as of this encounter: 83 kg (183 lb).    BMI is >= 25 and <30. (Overweight) The following options were offered after discussion;: exercise counseling/recommendations      Does the patient have evidence of cognitive impairment?   No          HEALTH RISK ASSESSMENT    Smoking Status:  Social History     Tobacco Use   Smoking Status Every Day    Packs/day: 0.25    Years: 30.00    Pack years: 7.50    Types: Cigarettes   Smokeless Tobacco Never     Alcohol Consumption:  Social History     Substance and Sexual Activity   Alcohol Use Not Currently    Comment: social     Fall Risk Screen:    STEADI Fall Risk Assessment was completed, and patient is at LOW risk for falls.Assessment completed on:2023    Depression Screen:       2023     8:28 AM   PHQ-2/PHQ-9 Depression Screening   Little Interest or Pleasure in Doing Things 0-->not at all   Feeling Down, Depressed or Hopeless 0-->not at all   PHQ-9: Brief Depression Severity Measure Score 0       Health Habits and Functional and Cognitive Screenin/14/2023     8:27 AM   Functional & Cognitive Status   Do you have difficulty preparing food and eating? No   Do you have difficulty bathing yourself, getting dressed or grooming yourself? No   Do you have difficulty using the toilet? No   Do you have difficulty moving around from place to place? No   Do you have trouble with steps or getting out of a bed or a chair? No   Current Diet Well Balanced Diet "   Dental Exam Up to date   Eye Exam Up to date   Exercise (times per week) 7 times per week   Current Exercises Include Walking        Exercise Comment steps   Do you need help using the phone?  No   Are you deaf or do you have serious difficulty hearing?  No   Do you need help to go to places out of walking distance? No   Do you need help shopping? No   Do you need help preparing meals?  No   Do you need help with housework?  No   Do you need help with laundry? No   Do you need help taking your medications? No   Do you need help managing money? No   Do you ever drive or ride in a car without wearing a seat belt? No   Have you felt unusual stress, anger or loneliness in the last month? No   Who do you live with? Sibling   If you need help, do you have trouble finding someone available to you? No   Have you been bothered in the last four weeks by sexual problems? No   Do you have difficulty concentrating, remembering or making decisions? No       Age-appropriate Screening Schedule:  Refer to the list below for future screening recommendations based on patient's age, sex and/or medical conditions. Orders for these recommended tests are listed in the plan section. The patient has been provided with a written plan.    Health Maintenance   Topic Date Due    TDAP/TD VACCINES (1 - Tdap) Never done    COVID-19 Vaccine (3 - Pfizer series) 11/11/2021    HEPATITIS C SCREENING  Never done    MAMMOGRAM  02/14/2024 (Originally 1960)    PAP SMEAR  09/14/2033 (Originally 2/9/2023)    ZOSTER VACCINE (1 of 2) 09/14/2033 (Originally 1/23/2009)    INFLUENZA VACCINE  10/01/2023    LIPID PANEL  02/15/2024    BMI FOLLOWUP  03/14/2024    ANNUAL WELLNESS VISIT  09/14/2024    COLORECTAL CANCER SCREENING  06/29/2026    Pneumococcal Vaccine 0-64  Completed          CMS Preventative Services Quick Reference  Risk Factors Identified During Encounter    Tobacco Use/Dependance Risk (use dotphrase .tobaccocessation for documentation)    The  "above risks/problems have been discussed with the patient.  Pertinent information has been shared with the patient in the After Visit Summary.  An After Visit Summary and PPPS were made available to the patient.  Diagnoses and all orders for this visit:    1. Anxiety (Primary)  Comments:  refuses medications.   stable    2. Posttraumatic stress disorder  Comments:  related to caring for special needs son and all she has been thru with his care    3. Mixed hyperlipidemia  Comments:  labs ordered  Orders:  -     Lipid Panel; Future  -     Comprehensive Metabolic Panel; Future    4. Need for hepatitis C screening test  Comments:  labs ordered  Orders:  -     Hepatitis C Antibody; Future    5. Tobacco use  Comments:  she continues to smoke      Follow Up:  Next Medicare Wellness visit to be scheduled in 1 year.        Additional E&M Note during same encounter follows:  Patient has multiple medical problems which are significant and separately identifiable that require additional work above and beyond the Medicare Wellness Visit.      Chief Complaint  Medicare Wellness-Initial Visit (Initial mcw) and Hyperlipidemia (6 month f/u)    Subjective        Hyperlipidemia    Tory Alston is also being seen today for management of her hyperlipidemia, has PTSD and anxiety both are stable.     Hyperlipidemia: atorvastatin 40 mg daily. She is walking drinking water eating vegetables.     History of colon polyps: to have colonoscopy every 3 years. Fragments of sessile serrated adenoma and hyperplastic polyp.    Tobacco use she continues to smoke.     History of right lower eyelid lesion: dx irritated pigmented seborrheic keratosis. Treated with erythromycin .    PTSD anxiety stable she reports not currently getting counseling.         Objective   Vital Signs:  /72   Pulse 67   Temp 98.6 °F (37 °C) (Infrared)   Ht 175.3 cm (69\")   Wt 83 kg (183 lb)   SpO2 96%   BMI 27.02 kg/m²     Physical Exam  Vitals and nursing note " reviewed.   Constitutional:       Appearance: Normal appearance.   HENT:      Head: Normocephalic.      Right Ear: External ear normal.      Left Ear: External ear normal.      Nose: Nose normal.   Cardiovascular:      Pulses: Normal pulses.      Heart sounds: Normal heart sounds.   Pulmonary:      Breath sounds: Normal breath sounds.   Abdominal:      General: Bowel sounds are normal.   Neurological:      General: No focal deficit present.      Mental Status: She is alert.   Psychiatric:         Mood and Affect: Mood normal.         Thought Content: Thought content normal.                       Assessment and Plan   Diagnoses and all orders for this visit:    1. Anxiety (Primary)  Comments:  refuses medications.   stable    2. Posttraumatic stress disorder  Comments:  related to caring for special needs son and all she has been thru with his care    3. Mixed hyperlipidemia  Comments:  labs ordered  Orders:  -     Lipid Panel; Future  -     Comprehensive Metabolic Panel; Future    4. Need for hepatitis C screening test  Comments:  labs ordered  Orders:  -     Hepatitis C Antibody; Future    5. Tobacco use  Comments:  she continues to smoke             Follow Up   No follow-ups on file.  Patient was given instructions and counseling regarding her condition or for health maintenance advice. Please see specific information pulled into the AVS if appropriate.

## 2023-09-19 ENCOUNTER — LAB (OUTPATIENT)
Dept: LAB | Facility: HOSPITAL | Age: 63
End: 2023-09-19
Payer: MEDICARE

## 2023-09-19 DIAGNOSIS — Z11.59 NEED FOR HEPATITIS C SCREENING TEST: ICD-10-CM

## 2023-09-19 DIAGNOSIS — E78.2 MIXED HYPERLIPIDEMIA: Chronic | ICD-10-CM

## 2023-09-19 LAB
ALBUMIN SERPL-MCNC: 4.4 G/DL (ref 3.5–5.2)
ALBUMIN/GLOB SERPL: 1.7 G/DL
ALP SERPL-CCNC: 83 U/L (ref 39–117)
ALT SERPL W P-5'-P-CCNC: 13 U/L (ref 1–33)
ANION GAP SERPL CALCULATED.3IONS-SCNC: 8 MMOL/L (ref 5–15)
AST SERPL-CCNC: 18 U/L (ref 1–32)
BILIRUB SERPL-MCNC: 0.3 MG/DL (ref 0–1.2)
BUN SERPL-MCNC: 14 MG/DL (ref 8–23)
BUN/CREAT SERPL: 20.3 (ref 7–25)
CALCIUM SPEC-SCNC: 9.5 MG/DL (ref 8.6–10.5)
CHLORIDE SERPL-SCNC: 105 MMOL/L (ref 98–107)
CHOLEST SERPL-MCNC: 135 MG/DL (ref 0–200)
CO2 SERPL-SCNC: 26 MMOL/L (ref 22–29)
CREAT SERPL-MCNC: 0.69 MG/DL (ref 0.57–1)
EGFRCR SERPLBLD CKD-EPI 2021: 97.1 ML/MIN/1.73
GLOBULIN UR ELPH-MCNC: 2.6 GM/DL
GLUCOSE SERPL-MCNC: 97 MG/DL (ref 65–99)
HCV AB SER DONR QL: NORMAL
HDLC SERPL-MCNC: 55 MG/DL (ref 40–60)
LDLC SERPL CALC-MCNC: 66 MG/DL (ref 0–100)
LDLC/HDLC SERPL: 1.2 {RATIO}
POTASSIUM SERPL-SCNC: 3.9 MMOL/L (ref 3.5–5.2)
PROT SERPL-MCNC: 7 G/DL (ref 6–8.5)
SODIUM SERPL-SCNC: 139 MMOL/L (ref 136–145)
TRIGL SERPL-MCNC: 69 MG/DL (ref 0–150)
VLDLC SERPL-MCNC: 14 MG/DL (ref 5–40)

## 2023-09-19 PROCEDURE — 86803 HEPATITIS C AB TEST: CPT

## 2023-09-19 PROCEDURE — 80061 LIPID PANEL: CPT

## 2023-09-19 PROCEDURE — 36415 COLL VENOUS BLD VENIPUNCTURE: CPT

## 2023-09-19 PROCEDURE — 80053 COMPREHEN METABOLIC PANEL: CPT

## 2023-11-08 ENCOUNTER — APPOINTMENT (OUTPATIENT)
Dept: GENERAL RADIOLOGY | Facility: HOSPITAL | Age: 63
DRG: 177 | End: 2023-11-08
Payer: MEDICARE

## 2023-11-08 ENCOUNTER — APPOINTMENT (OUTPATIENT)
Dept: CT IMAGING | Facility: HOSPITAL | Age: 63
DRG: 177 | End: 2023-11-08
Payer: MEDICARE

## 2023-11-08 ENCOUNTER — HOSPITAL ENCOUNTER (INPATIENT)
Facility: HOSPITAL | Age: 63
LOS: 2 days | Discharge: HOME | DRG: 177 | End: 2023-11-11
Attending: INTERNAL MEDICINE | Admitting: INTERNAL MEDICINE
Payer: MEDICARE

## 2023-11-08 ENCOUNTER — TELEPHONE (OUTPATIENT)
Dept: FAMILY MEDICINE CLINIC | Facility: CLINIC | Age: 63
End: 2023-11-08
Payer: MEDICARE

## 2023-11-08 DIAGNOSIS — J18.9 PNEUMONIA OF LEFT LOWER LOBE DUE TO INFECTIOUS ORGANISM: ICD-10-CM

## 2023-11-08 DIAGNOSIS — U07.1 COVID: ICD-10-CM

## 2023-11-08 DIAGNOSIS — R07.81 PLEURODYNIA: Primary | ICD-10-CM

## 2023-11-08 LAB
ALBUMIN SERPL-MCNC: 3.9 G/DL (ref 3.5–5.2)
ALBUMIN/GLOB SERPL: 1.1 G/DL
ALP SERPL-CCNC: 76 U/L (ref 39–117)
ALT SERPL W P-5'-P-CCNC: 6 U/L (ref 1–33)
ANION GAP SERPL CALCULATED.3IONS-SCNC: 12 MMOL/L (ref 5–15)
AST SERPL-CCNC: 12 U/L (ref 1–32)
B PARAPERT DNA SPEC QL NAA+PROBE: NOT DETECTED
B PERT DNA SPEC QL NAA+PROBE: NOT DETECTED
BASOPHILS # BLD MANUAL: 0.19 10*3/MM3 (ref 0–0.2)
BASOPHILS NFR BLD MANUAL: 1 % (ref 0–1.5)
BILIRUB SERPL-MCNC: 0.7 MG/DL (ref 0–1.2)
BUN SERPL-MCNC: 17 MG/DL (ref 8–23)
BUN/CREAT SERPL: 18.1 (ref 7–25)
C PNEUM DNA NPH QL NAA+NON-PROBE: NOT DETECTED
CALCIUM SPEC-SCNC: 9.2 MG/DL (ref 8.6–10.5)
CHLORIDE SERPL-SCNC: 96 MMOL/L (ref 98–107)
CO2 SERPL-SCNC: 25 MMOL/L (ref 22–29)
CREAT SERPL-MCNC: 0.94 MG/DL (ref 0.57–1)
CRP SERPL-MCNC: 20.57 MG/DL (ref 0–0.5)
D DIMER PPP FEU-MCNC: 0.96 MG/L (FEU) (ref 0–0.64)
D-LACTATE SERPL-SCNC: 1 MMOL/L (ref 0.3–2)
DEPRECATED RDW RBC AUTO: 43.3 FL (ref 37–54)
EGFRCR SERPLBLD CKD-EPI 2021: 67.9 ML/MIN/1.73
ERYTHROCYTE [DISTWIDTH] IN BLOOD BY AUTOMATED COUNT: 13.3 % (ref 12.3–15.4)
FERRITIN SERPL-MCNC: 804.6 NG/ML (ref 13–150)
FIBRINOGEN PPP-MCNC: 857 MG/DL (ref 210–450)
FLUAV SUBTYP SPEC NAA+PROBE: NOT DETECTED
FLUBV RNA ISLT QL NAA+PROBE: NOT DETECTED
GLOBULIN UR ELPH-MCNC: 3.4 GM/DL
GLUCOSE SERPL-MCNC: 158 MG/DL (ref 65–99)
HADV DNA SPEC NAA+PROBE: NOT DETECTED
HCOV 229E RNA SPEC QL NAA+PROBE: NOT DETECTED
HCOV HKU1 RNA SPEC QL NAA+PROBE: NOT DETECTED
HCOV NL63 RNA SPEC QL NAA+PROBE: NOT DETECTED
HCOV OC43 RNA SPEC QL NAA+PROBE: NOT DETECTED
HCT VFR BLD AUTO: 42.7 % (ref 34–46.6)
HGB BLD-MCNC: 14.1 G/DL (ref 12–15.9)
HMPV RNA NPH QL NAA+NON-PROBE: NOT DETECTED
HOLD SPECIMEN: NORMAL
HOLD SPECIMEN: NORMAL
HPIV1 RNA ISLT QL NAA+PROBE: NOT DETECTED
HPIV2 RNA SPEC QL NAA+PROBE: NOT DETECTED
HPIV3 RNA NPH QL NAA+PROBE: NOT DETECTED
HPIV4 P GENE NPH QL NAA+PROBE: NOT DETECTED
LARGE PLATELETS: ABNORMAL
LYMPHOCYTES # BLD MANUAL: 0.37 10*3/MM3 (ref 0.7–3.1)
LYMPHOCYTES NFR BLD MANUAL: 2 % (ref 5–12)
M PNEUMO IGG SER IA-ACNC: NOT DETECTED
MCH RBC QN AUTO: 31 PG (ref 26.6–33)
MCHC RBC AUTO-ENTMCNC: 32.9 G/DL (ref 31.5–35.7)
MCV RBC AUTO: 94.3 FL (ref 79–97)
MONOCYTES # BLD: 0.37 10*3/MM3 (ref 0.1–0.9)
NEUTROPHILS # BLD AUTO: 17.67 10*3/MM3 (ref 1.7–7)
NEUTROPHILS NFR BLD MANUAL: 89 % (ref 42.7–76)
NEUTS BAND NFR BLD MANUAL: 6 % (ref 0–5)
NEUTS VAC BLD QL SMEAR: ABNORMAL
PLATELET # BLD AUTO: 237 10*3/MM3 (ref 140–450)
PMV BLD AUTO: 9.6 FL (ref 6–12)
POTASSIUM SERPL-SCNC: 3.6 MMOL/L (ref 3.5–5.2)
PROCALCITONIN SERPL-MCNC: 0.74 NG/ML (ref 0–0.25)
PROCALCITONIN SERPL-MCNC: 1.47 NG/ML (ref 0–0.25)
PROT SERPL-MCNC: 7.3 G/DL (ref 6–8.5)
RBC # BLD AUTO: 4.53 10*6/MM3 (ref 3.77–5.28)
RBC MORPH BLD: NORMAL
RHINOVIRUS RNA SPEC NAA+PROBE: NOT DETECTED
RSV RNA NPH QL NAA+NON-PROBE: NOT DETECTED
SARS-COV-2 RNA NPH QL NAA+NON-PROBE: DETECTED
SCAN SLIDE: NORMAL
SODIUM SERPL-SCNC: 133 MMOL/L (ref 136–145)
VARIANT LYMPHS NFR BLD MANUAL: 2 % (ref 19.6–45.3)
WBC NRBC COR # BLD: 18.6 10*3/MM3 (ref 3.4–10.8)
WHOLE BLOOD HOLD COAG: NORMAL
WHOLE BLOOD HOLD SPECIMEN: NORMAL

## 2023-11-08 PROCEDURE — 94640 AIRWAY INHALATION TREATMENT: CPT

## 2023-11-08 PROCEDURE — 71275 CT ANGIOGRAPHY CHEST: CPT

## 2023-11-08 PROCEDURE — G0378 HOSPITAL OBSERVATION PER HR: HCPCS

## 2023-11-08 PROCEDURE — 25810000003 SODIUM CHLORIDE 0.9 % SOLUTION: Performed by: STUDENT IN AN ORGANIZED HEALTH CARE EDUCATION/TRAINING PROGRAM

## 2023-11-08 PROCEDURE — 82728 ASSAY OF FERRITIN: CPT | Performed by: PHYSICIAN ASSISTANT

## 2023-11-08 PROCEDURE — 80053 COMPREHEN METABOLIC PANEL: CPT | Performed by: EMERGENCY MEDICINE

## 2023-11-08 PROCEDURE — 84145 PROCALCITONIN (PCT): CPT | Performed by: EMERGENCY MEDICINE

## 2023-11-08 PROCEDURE — 87040 BLOOD CULTURE FOR BACTERIA: CPT | Performed by: PHYSICIAN ASSISTANT

## 2023-11-08 PROCEDURE — 25810000003 SODIUM CHLORIDE 0.9 % SOLUTION 250 ML FLEX CONT: Performed by: EMERGENCY MEDICINE

## 2023-11-08 PROCEDURE — 94799 UNLISTED PULMONARY SVC/PX: CPT

## 2023-11-08 PROCEDURE — 85007 BL SMEAR W/DIFF WBC COUNT: CPT | Performed by: EMERGENCY MEDICINE

## 2023-11-08 PROCEDURE — 25010000002 AZITHROMYCIN PER 500 MG: Performed by: EMERGENCY MEDICINE

## 2023-11-08 PROCEDURE — 36415 COLL VENOUS BLD VENIPUNCTURE: CPT | Performed by: PHYSICIAN ASSISTANT

## 2023-11-08 PROCEDURE — 25010000002 DIPHENHYDRAMINE PER 50 MG: Performed by: EMERGENCY MEDICINE

## 2023-11-08 PROCEDURE — 85379 FIBRIN DEGRADATION QUANT: CPT | Performed by: EMERGENCY MEDICINE

## 2023-11-08 PROCEDURE — 83605 ASSAY OF LACTIC ACID: CPT

## 2023-11-08 PROCEDURE — 25010000002 ENOXAPARIN PER 10 MG: Performed by: PHYSICIAN ASSISTANT

## 2023-11-08 PROCEDURE — 99285 EMERGENCY DEPT VISIT HI MDM: CPT

## 2023-11-08 PROCEDURE — 0202U NFCT DS 22 TRGT SARS-COV-2: CPT | Performed by: STUDENT IN AN ORGANIZED HEALTH CARE EDUCATION/TRAINING PROGRAM

## 2023-11-08 PROCEDURE — 85384 FIBRINOGEN ACTIVITY: CPT | Performed by: PHYSICIAN ASSISTANT

## 2023-11-08 PROCEDURE — 86140 C-REACTIVE PROTEIN: CPT | Performed by: PHYSICIAN ASSISTANT

## 2023-11-08 PROCEDURE — 25810000003 LACTATED RINGERS SOLUTION: Performed by: EMERGENCY MEDICINE

## 2023-11-08 PROCEDURE — 25510000001 IOPAMIDOL PER 1 ML: Performed by: EMERGENCY MEDICINE

## 2023-11-08 PROCEDURE — 25010000002 ONDANSETRON PER 1 MG: Performed by: EMERGENCY MEDICINE

## 2023-11-08 PROCEDURE — 85025 COMPLETE CBC W/AUTO DIFF WBC: CPT | Performed by: EMERGENCY MEDICINE

## 2023-11-08 PROCEDURE — 84145 PROCALCITONIN (PCT): CPT | Performed by: PHYSICIAN ASSISTANT

## 2023-11-08 PROCEDURE — 93005 ELECTROCARDIOGRAM TRACING: CPT

## 2023-11-08 PROCEDURE — 25010000002 KETOROLAC TROMETHAMINE PER 15 MG: Performed by: EMERGENCY MEDICINE

## 2023-11-08 PROCEDURE — 25010000002 CEFTRIAXONE PER 250 MG: Performed by: EMERGENCY MEDICINE

## 2023-11-08 PROCEDURE — 25010000002 PROCHLORPERAZINE 10 MG/2ML SOLUTION: Performed by: EMERGENCY MEDICINE

## 2023-11-08 PROCEDURE — 71045 X-RAY EXAM CHEST 1 VIEW: CPT

## 2023-11-08 PROCEDURE — 87040 BLOOD CULTURE FOR BACTERIA: CPT | Performed by: EMERGENCY MEDICINE

## 2023-11-08 RX ORDER — BUDESONIDE AND FORMOTEROL FUMARATE DIHYDRATE 160; 4.5 UG/1; UG/1
2 AEROSOL RESPIRATORY (INHALATION)
Status: DISCONTINUED | OUTPATIENT
Start: 2023-11-08 | End: 2023-11-11 | Stop reason: HOSPADM

## 2023-11-08 RX ORDER — ACETAMINOPHEN 160 MG/5ML
650 SOLUTION ORAL EVERY 4 HOURS PRN
Status: DISCONTINUED | OUTPATIENT
Start: 2023-11-08 | End: 2023-11-11 | Stop reason: HOSPADM

## 2023-11-08 RX ORDER — SODIUM CHLORIDE 9 MG/ML
40 INJECTION, SOLUTION INTRAVENOUS AS NEEDED
Status: DISCONTINUED | OUTPATIENT
Start: 2023-11-08 | End: 2023-11-11 | Stop reason: HOSPADM

## 2023-11-08 RX ORDER — CHOLECALCIFEROL (VITAMIN D3) 125 MCG
5 CAPSULE ORAL NIGHTLY PRN
Status: DISCONTINUED | OUTPATIENT
Start: 2023-11-08 | End: 2023-11-11 | Stop reason: HOSPADM

## 2023-11-08 RX ORDER — ACETAMINOPHEN 650 MG/1
650 SUPPOSITORY RECTAL EVERY 4 HOURS PRN
Status: DISCONTINUED | OUTPATIENT
Start: 2023-11-08 | End: 2023-11-11 | Stop reason: HOSPADM

## 2023-11-08 RX ORDER — PROCHLORPERAZINE EDISYLATE 5 MG/ML
10 INJECTION INTRAMUSCULAR; INTRAVENOUS ONCE
Status: COMPLETED | OUTPATIENT
Start: 2023-11-08 | End: 2023-11-08

## 2023-11-08 RX ORDER — ALUMINA, MAGNESIA, AND SIMETHICONE 2400; 2400; 240 MG/30ML; MG/30ML; MG/30ML
15 SUSPENSION ORAL EVERY 6 HOURS PRN
Status: DISCONTINUED | OUTPATIENT
Start: 2023-11-08 | End: 2023-11-11 | Stop reason: HOSPADM

## 2023-11-08 RX ORDER — BISACODYL 5 MG/1
5 TABLET, DELAYED RELEASE ORAL DAILY PRN
Status: DISCONTINUED | OUTPATIENT
Start: 2023-11-08 | End: 2023-11-11 | Stop reason: HOSPADM

## 2023-11-08 RX ORDER — SODIUM CHLORIDE 9 MG/ML
100 INJECTION, SOLUTION INTRAVENOUS CONTINUOUS
Status: DISCONTINUED | OUTPATIENT
Start: 2023-11-08 | End: 2023-11-11 | Stop reason: HOSPADM

## 2023-11-08 RX ORDER — IPRATROPIUM BROMIDE AND ALBUTEROL SULFATE 2.5; .5 MG/3ML; MG/3ML
3 SOLUTION RESPIRATORY (INHALATION)
Status: DISCONTINUED | OUTPATIENT
Start: 2023-11-08 | End: 2023-11-09

## 2023-11-08 RX ORDER — ENOXAPARIN SODIUM 100 MG/ML
40 INJECTION SUBCUTANEOUS DAILY
Status: DISCONTINUED | OUTPATIENT
Start: 2023-11-08 | End: 2023-11-11 | Stop reason: HOSPADM

## 2023-11-08 RX ORDER — ATORVASTATIN CALCIUM 40 MG/1
40 TABLET, FILM COATED ORAL DAILY
Status: DISCONTINUED | OUTPATIENT
Start: 2023-11-09 | End: 2023-11-11 | Stop reason: HOSPADM

## 2023-11-08 RX ORDER — DIPHENHYDRAMINE HYDROCHLORIDE 50 MG/ML
25 INJECTION INTRAMUSCULAR; INTRAVENOUS ONCE
Status: COMPLETED | OUTPATIENT
Start: 2023-11-08 | End: 2023-11-08

## 2023-11-08 RX ORDER — IBUPROFEN 400 MG/1
400 TABLET ORAL ONCE
Status: COMPLETED | OUTPATIENT
Start: 2023-11-09 | End: 2023-11-09

## 2023-11-08 RX ORDER — ONDANSETRON 2 MG/ML
4 INJECTION INTRAMUSCULAR; INTRAVENOUS EVERY 6 HOURS PRN
Status: DISCONTINUED | OUTPATIENT
Start: 2023-11-08 | End: 2023-11-11 | Stop reason: HOSPADM

## 2023-11-08 RX ORDER — BISACODYL 10 MG
10 SUPPOSITORY, RECTAL RECTAL DAILY PRN
Status: DISCONTINUED | OUTPATIENT
Start: 2023-11-08 | End: 2023-11-11 | Stop reason: HOSPADM

## 2023-11-08 RX ORDER — SODIUM CHLORIDE 0.9 % (FLUSH) 0.9 %
10 SYRINGE (ML) INJECTION AS NEEDED
Status: DISCONTINUED | OUTPATIENT
Start: 2023-11-08 | End: 2023-11-11 | Stop reason: HOSPADM

## 2023-11-08 RX ORDER — KETOROLAC TROMETHAMINE 15 MG/ML
15 INJECTION, SOLUTION INTRAMUSCULAR; INTRAVENOUS ONCE
Status: COMPLETED | OUTPATIENT
Start: 2023-11-08 | End: 2023-11-08

## 2023-11-08 RX ORDER — ACETAMINOPHEN 325 MG/1
650 TABLET ORAL EVERY 4 HOURS PRN
Status: DISCONTINUED | OUTPATIENT
Start: 2023-11-08 | End: 2023-11-11 | Stop reason: HOSPADM

## 2023-11-08 RX ORDER — METHYLPREDNISOLONE 4 MG/1
TABLET ORAL
COMMUNITY
Start: 2023-11-05

## 2023-11-08 RX ORDER — SODIUM CHLORIDE 0.9 % (FLUSH) 0.9 %
10 SYRINGE (ML) INJECTION EVERY 12 HOURS SCHEDULED
Status: DISCONTINUED | OUTPATIENT
Start: 2023-11-08 | End: 2023-11-11 | Stop reason: HOSPADM

## 2023-11-08 RX ORDER — ONDANSETRON 2 MG/ML
4 INJECTION INTRAMUSCULAR; INTRAVENOUS ONCE
Status: COMPLETED | OUTPATIENT
Start: 2023-11-08 | End: 2023-11-08

## 2023-11-08 RX ORDER — POLYETHYLENE GLYCOL 3350 17 G/17G
17 POWDER, FOR SOLUTION ORAL DAILY PRN
Status: DISCONTINUED | OUTPATIENT
Start: 2023-11-08 | End: 2023-11-11 | Stop reason: HOSPADM

## 2023-11-08 RX ORDER — ONDANSETRON 4 MG/1
4 TABLET, FILM COATED ORAL EVERY 6 HOURS PRN
Status: DISCONTINUED | OUTPATIENT
Start: 2023-11-08 | End: 2023-11-11 | Stop reason: HOSPADM

## 2023-11-08 RX ADMIN — BUDESONIDE AND FORMOTEROL FUMARATE DIHYDRATE 2 PUFF: 160; 4.5 AEROSOL RESPIRATORY (INHALATION) at 20:52

## 2023-11-08 RX ADMIN — ACETAMINOPHEN 650 MG: 325 TABLET, FILM COATED ORAL at 20:09

## 2023-11-08 RX ADMIN — PROCHLORPERAZINE EDISYLATE 10 MG: 5 INJECTION INTRAMUSCULAR; INTRAVENOUS at 14:25

## 2023-11-08 RX ADMIN — ENOXAPARIN SODIUM 40 MG: 100 INJECTION SUBCUTANEOUS at 19:52

## 2023-11-08 RX ADMIN — KETOROLAC TROMETHAMINE 15 MG: 15 INJECTION, SOLUTION INTRAMUSCULAR; INTRAVENOUS at 12:35

## 2023-11-08 RX ADMIN — IOPAMIDOL 100 ML: 755 INJECTION, SOLUTION INTRAVENOUS at 16:22

## 2023-11-08 RX ADMIN — Medication 5 MG: at 20:08

## 2023-11-08 RX ADMIN — SODIUM CHLORIDE 100 ML/HR: 9 INJECTION, SOLUTION INTRAVENOUS at 21:07

## 2023-11-08 RX ADMIN — Medication 10 ML: at 19:53

## 2023-11-08 RX ADMIN — SODIUM CHLORIDE, POTASSIUM CHLORIDE, SODIUM LACTATE AND CALCIUM CHLORIDE 1000 ML: 600; 310; 30; 20 INJECTION, SOLUTION INTRAVENOUS at 12:35

## 2023-11-08 RX ADMIN — AZITHROMYCIN MONOHYDRATE 500 MG: 500 INJECTION, POWDER, LYOPHILIZED, FOR SOLUTION INTRAVENOUS at 15:28

## 2023-11-08 RX ADMIN — DIPHENHYDRAMINE HYDROCHLORIDE 25 MG: 50 INJECTION, SOLUTION INTRAMUSCULAR; INTRAVENOUS at 14:25

## 2023-11-08 RX ADMIN — CEFTRIAXONE 1000 MG: 1 INJECTION, POWDER, FOR SOLUTION INTRAMUSCULAR; INTRAVENOUS at 14:28

## 2023-11-08 RX ADMIN — ONDANSETRON 4 MG: 2 INJECTION INTRAMUSCULAR; INTRAVENOUS at 12:35

## 2023-11-08 RX ADMIN — IPRATROPIUM BROMIDE AND ALBUTEROL SULFATE 3 ML: .5; 3 SOLUTION RESPIRATORY (INHALATION) at 20:47

## 2023-11-08 NOTE — TELEPHONE ENCOUNTER
Caller: NICOLE MCWILLIAMS    Relationship: Spouse    Best call back number: [phone]    What medication are you requesting: PAXLOVID COVID POSITIVE 11/7/23    What are your current symptoms: HEADACHE, FEVER, ACHE,     How long have you been experiencing symptoms: 2 DAYS    Have you had these symptoms before:    [x] Yes  [] No    Have you been treated for these symptoms before:   [x] Yes  [] No    If a prescription is needed, what is your preferred pharmacy and phone number: [facility]/PHARMACY #[id number] - [facility] [place] [address] [phone] [facility] [phone]      Additional notes:

## 2023-11-08 NOTE — ED NOTES
Pts  again at nursing station upset over wait time for room on floor. RNs tried explaining the ER to admission process.

## 2023-11-08 NOTE — ED NOTES
Family asked for a drink for the patient before being seen by provider. RN explained the importance of staying NPO until results have come back and provider approves oral fluids

## 2023-11-08 NOTE — TELEPHONE ENCOUNTER
Caller: NICOLE MCWILLIAMS     Relationship: Spouse     Best call back number: [phone]     What medication are you requesting: PAXLOVID COVID POSITIVE 11/7/23     What are your current symptoms: HEADACHE, FEVER, ACHE,      How long have you been experiencing symptoms: 2 DAYS     Have you had these symptoms before:                                  [x] Yes  [] No     Have you been treated for these symptoms before:              [x] Yes  [] No     If a prescription is needed, what is your preferred pharmacy and phone number: [facility]/PHARMACY #[id number] - [address] [phone] [facility] [phone]       Additional notes:  HE IS CHECKING ON THE STATUS OF THE MEDICATION.  [facility] INSTRUCTED HIM THAT FADI WOOD IS WITH PATIENTS.  HE IS AT [facility] WAITING ON THE PRESCRIPTION.

## 2023-11-08 NOTE — ED NOTES
Family asked again for a drink for the patient. RN explained the importance of staying NPO until results have come back and provider approves oral fluids

## 2023-11-08 NOTE — TELEPHONE ENCOUNTER
Patient's  came into the office requesting to speak with Margarita about this prescription being called in immediately as his wife has been bed ridden for two days. Advised  that Margarita is out of the office on Wednesdays. Advised that Margarita does check messages on her days off periodically but I cannot guarantee when she will see this. Advised if patient is that severe, she needs to go to the ED for urgent evaluation.

## 2023-11-08 NOTE — ED PROVIDER NOTES
Subjective   History of Present Illness  63-year-old female presents with left-sided chest pain.  She reports has had this for about a week she has had some fever decreased p.o. intake she has had nausea no vomiting she has had diarrhea.  She has had no urinary symptoms.  She has been seen in urgent care was thought that she had pleurisy.  She states she did COVID test today which was positive.  She has had symptoms though for about a week at this time.  Review of Systems    Past Medical History:   Diagnosis Date    Anxiety     PTSD (post-traumatic stress disorder)    Hyperlipidemia    No Known Allergies    Past Surgical History:   Procedure Laterality Date    LAPAROSCOPIC TUBAL LIGATION      1980       Family History   Problem Relation Age of Onset    COPD Mother     Other Brother         obesity    Heart disease Brother     Hyperlipidemia Brother     Hypertension Brother     Diabetes Brother        Social History     Socioeconomic History    Marital status: Single   Tobacco Use    Smoking status: Every Day     Packs/day: 0.25     Years: 30.00     Additional pack years: 0.00     Total pack years: 7.50     Types: Cigarettes    Smokeless tobacco: Never   Vaping Use    Vaping Use: Never used   Substance and Sexual Activity    Alcohol use: Not Currently     Comment: social    Drug use: Not Currently     Types: Marijuana     Comment: former    Sexual activity: Defer     Partners: Male     Comment: occasionaly   Only current medications statin        Objective   Physical Exam  63-year-old female awake alert.  Generally well-developed.  Pupils equal round to light.  Oropharynx mildly dry neck supple chest reveals some basilar rales on the left.  Cardiovascular regular in rhythm abdomen soft nontender extremities no tenderness or edema.  Procedures           ED Course  ED Course as of 11/08/23 1656   Wed Nov 08, 2023   1540 Awaiting CT [SP]      ED Course User Index  [SP] Ranjit Cohn MD      Results for orders placed  or performed during the hospital encounter of 11/08/23   Comprehensive Metabolic Panel    Specimen: Blood   Result Value Ref Range    Glucose 158 (H) 65 - 99 mg/dL    BUN 17 8 - 23 mg/dL    Creatinine 0.94 0.57 - 1.00 mg/dL    Sodium 133 (L) 136 - 145 mmol/L    Potassium 3.6 3.5 - 5.2 mmol/L    Chloride 96 (L) 98 - 107 mmol/L    CO2 25.0 22.0 - 29.0 mmol/L    Calcium 9.2 8.6 - 10.5 mg/dL    Total Protein 7.3 6.0 - 8.5 g/dL    Albumin 3.9 3.5 - 5.2 g/dL    ALT (SGPT) 6 1 - 33 U/L    AST (SGOT) 12 1 - 32 U/L    Alkaline Phosphatase 76 39 - 117 U/L    Total Bilirubin 0.7 0.0 - 1.2 mg/dL    Globulin 3.4 gm/dL    A/G Ratio 1.1 g/dL    BUN/Creatinine Ratio 18.1 7.0 - 25.0    Anion Gap 12.0 5.0 - 15.0 mmol/L    eGFR 67.9 >60.0 mL/min/1.73   D-dimer, Quantitative    Specimen: Blood   Result Value Ref Range    D-Dimer, Quantitative 0.96 (H) 0.00 - 0.64 mg/L (FEU)   Procalcitonin    Specimen: Blood   Result Value Ref Range    Procalcitonin 0.74 (H) 0.00 - 0.25 ng/mL   CBC Auto Differential    Specimen: Blood   Result Value Ref Range    WBC 18.60 (H) 3.40 - 10.80 10*3/mm3    RBC 4.53 3.77 - 5.28 10*6/mm3    Hemoglobin 14.1 12.0 - 15.9 g/dL    Hematocrit 42.7 34.0 - 46.6 %    MCV 94.3 79.0 - 97.0 fL    MCH 31.0 26.6 - 33.0 pg    MCHC 32.9 31.5 - 35.7 g/dL    RDW 13.3 12.3 - 15.4 %    RDW-SD 43.3 37.0 - 54.0 fl    MPV 9.6 6.0 - 12.0 fL    Platelets 237 140 - 450 10*3/mm3   Scan Slide    Specimen: Blood   Result Value Ref Range    Scan Slide     Manual Differential    Specimen: Blood   Result Value Ref Range    Neutrophil % 89.0 (H) 42.7 - 76.0 %    Lymphocyte % 2.0 (L) 19.6 - 45.3 %    Monocyte % 2.0 (L) 5.0 - 12.0 %    Basophil % 1.0 0.0 - 1.5 %    Bands %  6.0 (H) 0.0 - 5.0 %    Neutrophils Absolute 17.67 (H) 1.70 - 7.00 10*3/mm3    Lymphocytes Absolute 0.37 (L) 0.70 - 3.10 10*3/mm3    Monocytes Absolute 0.37 0.10 - 0.90 10*3/mm3    Basophils Absolute 0.19 0.00 - 0.20 10*3/mm3    RBC Morphology Normal Normal    Vacuolated  Neutrophils Slight/1+ None Seen    Large Platelets Slight/1+ None Seen   POC Lactate    Specimen: Blood   Result Value Ref Range    Lactate 1.0 0.3 - 2.0 mmol/L   ECG 12 Lead ED Triage Standing Order; Weak / Dizzy / AMS   Result Value Ref Range    QT Interval 389 ms    QTC Interval 449 ms   Green Top (Gel)   Result Value Ref Range    Extra Tube Hold for add-ons.    Lavender Top   Result Value Ref Range    Extra Tube hold for add-on    Gold Top - SST   Result Value Ref Range    Extra Tube Hold for add-ons.    Light Blue Top   Result Value Ref Range    Extra Tube Hold for add-ons.      CT Angiogram Chest Pulmonary Embolism    Result Date: 11/8/2023  1.No evidence of pulmonary embolus. 2.Left lower lobe pneumonia. Borderline enlarged hilar mediastinal lymph nodes. Trace size left pleural effusion. 3.Mild hepatic steatosis. Electronically Signed: Anum Salas MD  11/8/2023 4:33 PM EST  Workstation ID: [id number]    XR Chest 1 View    Result Date: 11/8/2023  Impression: Findings are consistent with the appearance of left lower lobe pneumonia. Radiographic follow-up after trial of therapy is recommended to document resolution. Electronically Signed: Christy Mai MD  11/8/2023 1:08 PM EST  Workstation ID: [id number]   Medications   sodium chloride 0.9 % flush 10 mL (has no administration in time range)   lactated ringers bolus 1,000 mL (0 mL Intravenous Stopped 11/8/23 1410)   ondansetron (ZOFRAN) injection 4 mg (4 mg Intravenous Given 11/8/23 1235)   ketorolac (TORADOL) injection 15 mg (15 mg Intravenous Given 11/8/23 1235)   cefTRIAXone (ROCEPHIN) 1,000 mg in sodium chloride 0.9 % 100 mL IVPB (0 mg Intravenous Stopped 11/8/23 1515)   azithromycin (ZITHROMAX) 500 mg in sodium chloride 0.9 % 250 mL IVPB-VTB (0 mg Intravenous Stopped 11/8/23 1631)   prochlorperazine (COMPAZINE) injection 10 mg (10 mg Intravenous Given 11/8/23 1425)   diphenhydrAMINE (BENADRYL) injection 25 mg (25 mg Intravenous Given 11/8/23 1425)   iopamidol  "(ISOVUE-370) 76 % injection 100 mL (100 mL Intravenous Given 11/8/23 1622)     /52   Pulse 84   Temp 99.2 °F (37.3 °C) (Temporal)   Resp 20   Ht 175.3 cm (69\")   Wt 79.4 kg (175 lb 0.7 oz)   SpO2 95%   BMI 25.85 kg/m²                                        Medical Decision Making  Problems Addressed:  COVID: complicated acute illness or injury  Pleurodynia: complicated acute illness or injury  Pneumonia of left lower lobe due to infectious organism: complicated acute illness or injury    Amount and/or Complexity of Data Reviewed  Labs: ordered.  Radiology: ordered.    Risk  Prescription drug management.  Decision regarding hospitalization.    Chart review: Patient has been treated in the office on September for anxiety posttraumatic stress disorder.  Comorbidity: As per past history   Differential: COVID, pneumonia, left leg abnormality, pulmonary embolus  My EKG interpretation: Sinus rhythm rate of 80  Lab: White count 18.6 with 89 segs 6 bands on differential.  Comprehensive metabolic panel no significant abnormality noted glucose 158 D-dimer elevated at 0.96 procalcitonin elevated 0.74.  Lactic acid normal 1.0  My Radiology review and interpretation: Chest x-ray reveals left lower lobe infiltrate consistent with pneumonia.  CT scan of chest was negative for pulmonary embolus.  This revealed left lower lobe pneumonia and trace left pleural effusion with mild hepatic steatosis.  Discussion/treatment: Patient IV placed.  Was given a liter of IV fluids.  Was given Zofran.  Was given ketorolac for pain in her chest.  She later complained that she was having a migraine and was given Compazine and Benadryl.  With elevated D-dimer CT scan of chest has been ordered still pending.  Patient's findings were discussed with her.  Her symptoms appear most likely secondary to bacterial pneumonia not COVID.  She was started on antibiotics with Rocephin and Zithromax.  She was discussed with the PA for the " hospitalist.  Will be admitted to their service for continued care.  Patient was evaluated using appropriate PPE      Final diagnoses:   Pleurodynia   Pneumonia of left lower lobe due to infectious organism   COVID       ED Disposition  ED Disposition       ED Disposition   Decision to Admit    Condition   --    Comment   Level of Care: Med/Surg [1]   Admitting Physician: JETT MANNING [[id number]]                 No follow-up provider specified.       Medication List      No changes were made to your prescriptions during this visit.            Ranjit Cohn MD  11/08/23 [id number]

## 2023-11-08 NOTE — Clinical Note
Level of Care: Med/Surg [1]   Diagnosis: PNA (pneumonia) [[id number]]   Admitting Physician: JETT MANNING [[id number]]

## 2023-11-08 NOTE — ED NOTES
Pts  came out upset that pts BP was running a little low. Pts family reminded to use call light and to stay in room due to patients positive covid test this morning (per pt).

## 2023-11-08 NOTE — ED NOTES
Pts  is upset over time spent in ER and upset other time waiting to be moved to a floor. RN explained and apologized for the wait.  of pt expressed taking patient home against medical advice if pt was not moved soon.

## 2023-11-09 PROBLEM — J18.9 PNEUMONIA: Status: ACTIVE | Noted: 2023-11-09

## 2023-11-09 LAB
ANION GAP SERPL CALCULATED.3IONS-SCNC: 9 MMOL/L (ref 5–15)
BASOPHILS # BLD AUTO: 0.1 10*3/MM3 (ref 0–0.2)
BASOPHILS NFR BLD AUTO: 0.5 % (ref 0–1.5)
BUN SERPL-MCNC: 21 MG/DL (ref 8–23)
BUN/CREAT SERPL: 23.9 (ref 7–25)
CALCIUM SPEC-SCNC: 8 MG/DL (ref 8.6–10.5)
CHLORIDE SERPL-SCNC: 101 MMOL/L (ref 98–107)
CO2 SERPL-SCNC: 23 MMOL/L (ref 22–29)
CREAT SERPL-MCNC: 0.88 MG/DL (ref 0.57–1)
DEPRECATED RDW RBC AUTO: 45.9 FL (ref 37–54)
EGFRCR SERPLBLD CKD-EPI 2021: 73.5 ML/MIN/1.73
EOSINOPHIL # BLD AUTO: 0 10*3/MM3 (ref 0–0.4)
EOSINOPHIL NFR BLD AUTO: 0.1 % (ref 0.3–6.2)
ERYTHROCYTE [DISTWIDTH] IN BLOOD BY AUTOMATED COUNT: 13.4 % (ref 12.3–15.4)
GLUCOSE SERPL-MCNC: 112 MG/DL (ref 65–99)
HCT VFR BLD AUTO: 34.4 % (ref 34–46.6)
HGB BLD-MCNC: 11.5 G/DL (ref 12–15.9)
L PNEUMO1 AG UR QL IA: POSITIVE
LYMPHOCYTES # BLD AUTO: 0.8 10*3/MM3 (ref 0.7–3.1)
LYMPHOCYTES NFR BLD AUTO: 5.4 % (ref 19.6–45.3)
MCH RBC QN AUTO: 31.3 PG (ref 26.6–33)
MCHC RBC AUTO-ENTMCNC: 33.6 G/DL (ref 31.5–35.7)
MCV RBC AUTO: 93.2 FL (ref 79–97)
MONOCYTES # BLD AUTO: 0.8 10*3/MM3 (ref 0.1–0.9)
MONOCYTES NFR BLD AUTO: 5.4 % (ref 5–12)
NEUTROPHILS NFR BLD AUTO: 13.1 10*3/MM3 (ref 1.7–7)
NEUTROPHILS NFR BLD AUTO: 88.6 % (ref 42.7–76)
NRBC BLD AUTO-RTO: 0 /100 WBC (ref 0–0.2)
PLATELET # BLD AUTO: 195 10*3/MM3 (ref 140–450)
PMV BLD AUTO: 10.2 FL (ref 6–12)
POTASSIUM SERPL-SCNC: 3.7 MMOL/L (ref 3.5–5.2)
QT INTERVAL: 389 MS
QTC INTERVAL: 449 MS
RBC # BLD AUTO: 3.69 10*6/MM3 (ref 3.77–5.28)
S PNEUM AG SPEC QL LA: NEGATIVE
SODIUM SERPL-SCNC: 133 MMOL/L (ref 136–145)
WBC NRBC COR # BLD: 14.8 10*3/MM3 (ref 3.4–10.8)

## 2023-11-09 PROCEDURE — 25010000002 CEFTRIAXONE PER 250 MG: Performed by: STUDENT IN AN ORGANIZED HEALTH CARE EDUCATION/TRAINING PROGRAM

## 2023-11-09 PROCEDURE — 25010000002 AZITHROMYCIN PER 500 MG: Performed by: STUDENT IN AN ORGANIZED HEALTH CARE EDUCATION/TRAINING PROGRAM

## 2023-11-09 PROCEDURE — 94799 UNLISTED PULMONARY SVC/PX: CPT

## 2023-11-09 PROCEDURE — 94761 N-INVAS EAR/PLS OXIMETRY MLT: CPT

## 2023-11-09 PROCEDURE — 85025 COMPLETE CBC W/AUTO DIFF WBC: CPT | Performed by: STUDENT IN AN ORGANIZED HEALTH CARE EDUCATION/TRAINING PROGRAM

## 2023-11-09 PROCEDURE — 87449 NOS EACH ORGANISM AG IA: CPT | Performed by: PHYSICIAN ASSISTANT

## 2023-11-09 PROCEDURE — 94664 DEMO&/EVAL PT USE INHALER: CPT

## 2023-11-09 PROCEDURE — 25010000002 ENOXAPARIN PER 10 MG: Performed by: PHYSICIAN ASSISTANT

## 2023-11-09 PROCEDURE — 80048 BASIC METABOLIC PNL TOTAL CA: CPT | Performed by: STUDENT IN AN ORGANIZED HEALTH CARE EDUCATION/TRAINING PROGRAM

## 2023-11-09 PROCEDURE — 25810000003 SODIUM CHLORIDE 0.9 % SOLUTION 250 ML FLEX CONT: Performed by: STUDENT IN AN ORGANIZED HEALTH CARE EDUCATION/TRAINING PROGRAM

## 2023-11-09 PROCEDURE — 25810000003 SODIUM CHLORIDE 0.9 % SOLUTION: Performed by: STUDENT IN AN ORGANIZED HEALTH CARE EDUCATION/TRAINING PROGRAM

## 2023-11-09 PROCEDURE — 87899 AGENT NOS ASSAY W/OPTIC: CPT | Performed by: PHYSICIAN ASSISTANT

## 2023-11-09 RX ORDER — ALBUTEROL SULFATE 90 UG/1
2 AEROSOL, METERED RESPIRATORY (INHALATION)
Status: DISCONTINUED | OUTPATIENT
Start: 2023-11-09 | End: 2023-11-11 | Stop reason: HOSPADM

## 2023-11-09 RX ORDER — MIDODRINE HYDROCHLORIDE 5 MG/1
10 TABLET ORAL 3 TIMES DAILY PRN
Status: DISCONTINUED | OUTPATIENT
Start: 2023-11-09 | End: 2023-11-11 | Stop reason: HOSPADM

## 2023-11-09 RX ADMIN — IBUPROFEN 400 MG: 400 TABLET, FILM COATED ORAL at 00:12

## 2023-11-09 RX ADMIN — Medication 10 ML: at 08:23

## 2023-11-09 RX ADMIN — BUDESONIDE AND FORMOTEROL FUMARATE DIHYDRATE 2 PUFF: 160; 4.5 AEROSOL RESPIRATORY (INHALATION) at 08:00

## 2023-11-09 RX ADMIN — CEFTRIAXONE 2000 MG: 2 INJECTION, POWDER, FOR SOLUTION INTRAMUSCULAR; INTRAVENOUS at 14:01

## 2023-11-09 RX ADMIN — ATORVASTATIN CALCIUM 40 MG: 40 TABLET, FILM COATED ORAL at 08:22

## 2023-11-09 RX ADMIN — AZITHROMYCIN MONOHYDRATE 500 MG: 500 INJECTION, POWDER, LYOPHILIZED, FOR SOLUTION INTRAVENOUS at 15:08

## 2023-11-09 RX ADMIN — ENOXAPARIN SODIUM 40 MG: 100 INJECTION SUBCUTANEOUS at 16:06

## 2023-11-09 RX ADMIN — MIDODRINE HYDROCHLORIDE 10 MG: 5 TABLET ORAL at 03:28

## 2023-11-09 RX ADMIN — SODIUM CHLORIDE 500 ML: 9 INJECTION, SOLUTION INTRAVENOUS at 02:07

## 2023-11-09 RX ADMIN — Medication 10 ML: at 21:16

## 2023-11-09 RX ADMIN — ACETAMINOPHEN 650 MG: 325 TABLET, FILM COATED ORAL at 15:16

## 2023-11-09 NOTE — PROGRESS NOTES
[facility]     Progress Note    Patient Name: Tory Alston  : 1960  MRN: [id number]  Primary Care Physician:  Margarita Erazo APRN  Date of admission: 2023    Subjective   Subjective     Chief Complaint: dyspnea    History of Present Illness  Patient Reports feeling tired. However, denies any soa or cp. Breathing RA.     Review of Systems  12 point ROS reviewed and negative except as mentioned above   Objective   Objective     Vitals:   Temp:  [97.4 °F (36.3 °C)-101.5 °F (38.6 °C)] 97.4 °F (36.3 °C)  Heart Rate:  [54-98] 56  Resp:  [12-24] 12  BP: ()/(44-67) 93/58    Physical Exam   Constitutional:       General: She is not in acute distress.     Appearance: Normal appearance. She is not toxic-appearing.   HENT:      Head: Normocephalic and atraumatic.      Nose: Nose normal. No congestion.      Mouth/Throat:      Pharynx: Oropharynx is clear. No oropharyngeal exudate.   Eyes:      General: No scleral icterus.  Cardiovascular:      Rate and Rhythm: Normal rate and regular rhythm.      Heart sounds: No murmur heard.     No friction rub. No gallop.   Pulmonary:      Effort: No respiratory distress.      Breath sounds: Rales present. No wheezing.   Abdominal:      General: There is no distension.      Tenderness: There is no abdominal tenderness. There is no guarding.   Musculoskeletal:         General: No swelling or deformity.      Cervical back: Normal range of motion. No rigidity.      Right lower leg: No edema.      Left lower leg: No edema.   Skin:     Coloration: Skin is not jaundiced.      Findings: No bruising or lesion.   Neurological:      General: No focal deficit present.      Mental Status: She is alert and oriented to person, place, and time.      Motor: No weakness.   Result Review    Result Review:  I have personally reviewed the results from the time of this admission to 2023 09:41 EST and agree with these findings:  [x]  Laboratory list / accordion  [x]   Microbiology  []  Radiology  []  EKG/Telemetry   [x]  Cardiology/Vascular   []  Pathology  []  Old records  []  Other:        Assessment & Plan   Assessment / Plan     Brief Patient Summary:  Tory Alston is a 63 y.o. female     Active Hospital Problems:  Active Hospital Problems    Diagnosis     **PNA (pneumonia)     COVID-19 virus detected      Plan:   #Covid 19  #Pneumonia    - Patient states Covid test at home positive, will check RVP here    - CT PE without PE but does shows LLL pneumonia with borderline enlarged hilar mediastinal lymph nodes and trace left pleural effusion    - suspect reactive lymphadenopathy    - pneumonia most likely 2/2 covid but will cover with abx due to leukocytosis     - Rocephin 2g IV daily, watch for toxicity    - Azithromycin 500mg IV daily x 3 days    - strep, legionella, sputum    - check RVP    - stable on room air    - wbc 18.6, trend    - blood cultures    - procal 0.74    - Symbicort BID    - On room air, hold off on decadron and Remdesivir for the time being, consider starting if situation escalates    - EKG NRS without gross ischemia    - Tylenol PRN fever      #Hyponatremia    - sodium 133 and Cl 96 on admission    - NS @ 100/hr    - suspect 2/2 volume depletion    - monitor labs     #HLD    -resume home statin     DVT prophylaxis:  Medical DVT prophylaxis orders are present.    Positive for legionella. Continue IV abx. BP in the low range, continue IV NS. Midodrine prn. Labs reviewed. Repeat labs and monitor.      CODE STATUS:    Code Status (Patient has no pulse and is not breathing): CPR (Attempt to Resuscitate)  Medical Interventions (Patient has pulse or is breathing): Full Support    Disposition:  I expect patient to be discharged home in couple days.    Cj Crain MD

## 2023-11-09 NOTE — H&P
[facility] Medicine Services      Patient Name: Tory Alston  : 1960  MRN: [id number]  Primary Care Physician:  Margarita Erazo APRN  Date of admission: 2023      Subjective      Chief Complaint: dyspnea    History of Present Illness: Tory Alston is a 63 y.o. female who presented to [facility] on 2023 complaining of dyspnea.  Admits to worsening dyspnea over the past week complicated by fevers and chills complicated by pleuritic chest pain with decreased intake.  Denies n/v/d, productive cough, abdominal pain.  She took a home Covid test today which was positive.  Due to continued symptoms she presented to [facility] for evaluation.      In the ED, vitals 99.2F, HR 83, RR 23, /45, 91% RA.  Labs notable for sodium 133, Cl 96, glucose 158, procal 0.74, d-dimer 0.96, lactate 1.0, wbc 18.6, 6% bands.  CT PE without PE but does shows LLL pneumonia.  CVP positive for covid.  She is to be admitted for treatment of pneumonia and covid.       ROS   12 point ROS reviewed and negative except as mentioned above      Personal History     Past Medical History:   Diagnosis Date    Anxiety     PTSD (post-traumatic stress disorder)        Past Surgical History:   Procedure Laterality Date    LAPAROSCOPIC TUBAL LIGATION             Family History: family history includes COPD in her mother; Diabetes in her brother; Heart disease in her brother; Hyperlipidemia in her brother; Hypertension in her brother; Other in her brother. Otherwise pertinent FHx was reviewed and not pertinent to current issue.    Social History:  reports that she has been smoking cigarettes. She has a 7.50 pack-year smoking history. She has never used smokeless tobacco. She reports that she does not currently use alcohol. She reports that she does not currently use drugs after having used the following drugs: Marijuana.    Home Medications:  Prior to Admission Medications       Prescriptions Last Dose  Informant Patient Reported? Taking?    atorvastatin (LIPITOR) 40 MG tablet   No No    TAKE 1 TABLET BY MOUTH EVERY DAY    methylPREDNISolone (MEDROL) 4 MG dose pack   Yes No    TAKE 6 TABLETS ON DAY 1 AS DIRECTED ON PACKAGE AND DECREASE BY 1 TAB EACH DAY FOR A TOTAL OF 6 DAYS              Allergies:  No Known Allergies    Objective      Vitals:   Temp:  [99.2 °F (37.3 °C)] 99.2 °F (37.3 °C)  Heart Rate:  [68-98] 80  Resp:  [20-24] 24  BP: ()/(45-67) 122/45    Physical Exam  Constitutional:       General: She is not in acute distress.     Appearance: Normal appearance. She is not toxic-appearing.   HENT:      Head: Normocephalic and atraumatic.      Nose: Nose normal. No congestion.      Mouth/Throat:      Pharynx: Oropharynx is clear. No oropharyngeal exudate.   Eyes:      General: No scleral icterus.  Cardiovascular:      Rate and Rhythm: Normal rate and regular rhythm.      Heart sounds: No murmur heard.     No friction rub. No gallop.   Pulmonary:      Effort: No respiratory distress.      Breath sounds: Rales present. No wheezing.   Abdominal:      General: There is no distension.      Tenderness: There is no abdominal tenderness. There is no guarding.   Musculoskeletal:         General: No swelling or deformity.      Cervical back: Normal range of motion. No rigidity.      Right lower leg: No edema.      Left lower leg: No edema.   Skin:     Coloration: Skin is not jaundiced.      Findings: No bruising or lesion.   Neurological:      General: No focal deficit present.      Mental Status: She is alert and oriented to person, place, and time.      Motor: No weakness.          Result Review    Result Review:  I have personally reviewed the results from the time of this admission to 11/8/2023 19:56 EST and agree with these findings:  [x]  Laboratory  []  Microbiology  [x]  Radiology  [x]  EKG/Telemetry   []  Cardiology/Vascular   []  Pathology  []  Old records  []  Other:        Assessment & Plan         Active Hospital Problems:  Active Hospital Problems    Diagnosis     **PNA (pneumonia)     COVID-19 virus detected      Plan:     #Covid 19  #Pneumonia    - Patient states Covid test at home positive, will check RVP here    - CT PE without PE but does shows LLL pneumonia with borderline enlarged hilar mediastinal lymph nodes and trace left pleural effusion    - suspect reactive lymphadenopathy    - pneumonia most likely 2/2 covid but will cover with abx due to leukocytosis     - Rocephin 2g IV daily, watch for toxicity    - Azithromycin 500mg IV daily x 3 days    - strep, legionella, sputum    - check RVP    - stable on room air    - wbc 18.6, trend    - blood cultures    - procal 0.74    - Symbicort BID    - On room air, hold off on decadron and Remdesivir for the time being, consider starting if situation escalates    - EKG NRS without gross ischemia    - Tylenol PRN fever      #Hyponatremia    - sodium 133 and Cl 96 on admission    - NS @ 100/hr    - suspect 2/2 volume depletion    - monitor labs    #HLD    -resume home statin      DVT prophylaxis: Lovenox  Medical DVT prophylaxis orders are present.    CODE STATUS:    Code Status (Patient has no pulse and is not breathing): CPR (Attempt to Resuscitate)  Medical Interventions (Patient has pulse or is breathing): Full Support    Admission Status:  I believe this patient meets inpatient status.    I discussed the patient's findings and my recommendations with patient and family.    This patient has been examined wearing appropriate Personal Protective Equipment and discussed with  Berto . 11/08/23      Signature: Electronically signed by Thee Gill DO, 11/08/23, 11:05 PM [email or web address]

## 2023-11-09 NOTE — CASE MANAGEMENT/SOCIAL WORK
Discharge Planning Assessment  [facility]     Patient Name: Tory Alston  MRN: [id number]  Today's Date: 11/9/2023    Admit Date: 11/8/2023    Plan: Home with family   Discharge Needs Assessment       Row Name 11/09/23 1152       Living Environment    People in Home sibling(s)    Name(s) of People in Home Brother Petros and sister in law Katheryn    Current Living Arrangements home    Potentially Unsafe Housing Conditions none    Primary Care Provided by self    Provides Primary Care For no one    Family Caregiver if Needed other relative(s);sibling(s)    Family Caregiver Names Brother Petros and sister in law Katheryn    Quality of Family Relationships supportive    Able to Return to Prior Arrangements yes       Resource/Environmental Concerns    Resource/Environmental Concerns none    Transportation Concerns none       Transition Planning    Patient/Family Anticipates Transition to home with family    Patient/Family Anticipated Services at Transition none    Transportation Anticipated family or friend will provide       Discharge Needs Assessment    Equipment Currently Used at Home none    Concerns to be Addressed denies needs/concerns at this time    Anticipated Changes Related to Illness none    Equipment Needed After Discharge none                   Discharge Plan       Row Name 11/09/23 1200       Plan    Plan Home with family    Plan Comments Pt reports she lives with brother and sister in law and is IADLs. Denies use of any dme. PCP confirmed and she wants enrolled in Meds to Bed.She denies difficulty obtaining medications or transportation and states her brother or sister in law will pick her up at dc. She intends to return home with family and denies dc needs. DC Barriers: IV antibiotics, IV fluids.                  Continued Care and Services - Admitted Since 11/8/2023    Coordination has not been started for this encounter.          Demographic Summary       Row Name 11/09/23 115       General Information     Admission Type observation    Arrived From home    Required Notices Provided Observation Status Notice    Referral Source admission list    Reason for Consult discharge planning    Preferred Language English       Contact Information    Permission Granted to Share Info With                    Functional Status       Row Name 11/09/23 1151       Functional Status    Usual Activity Tolerance good    Current Activity Tolerance moderate       Functional Status, IADL    Medications independent    Meal Preparation independent    Housekeeping independent    Laundry independent    Shopping independent                   Psychosocial    No documentation.                  Abuse/Neglect    No documentation.                  Legal    No documentation.                  Substance Abuse    No documentation.                  Patient Forms       Row Name 11/09/23 1201       Patient Forms    Important Message from Medicare ([facility]) --  NAIR 11/8/23 per registration    Patient Observation Letter Delivered  NAIR 11/8/23 per registration                  Sherie Garcia RN, [facility]  Office: [phone]  Fax: [phone]  [email or web address]      Phone communication or documentation only - no physical contact with patient or family.    Sherie Garcia RN

## 2023-11-09 NOTE — PLAN OF CARE
Goal Outcome Evaluation:  Patient is alert and oriented, able to make needs known to staff. Patient had complaint of pain, PRN medication effective.

## 2023-11-10 LAB
ANION GAP SERPL CALCULATED.3IONS-SCNC: 10 MMOL/L (ref 5–15)
BASOPHILS # BLD AUTO: 0 10*3/MM3 (ref 0–0.2)
BASOPHILS NFR BLD AUTO: 0.3 % (ref 0–1.5)
BUN SERPL-MCNC: 17 MG/DL (ref 8–23)
BUN/CREAT SERPL: 26.6 (ref 7–25)
CALCIUM SPEC-SCNC: 8 MG/DL (ref 8.6–10.5)
CHLORIDE SERPL-SCNC: 102 MMOL/L (ref 98–107)
CO2 SERPL-SCNC: 21 MMOL/L (ref 22–29)
CREAT SERPL-MCNC: 0.64 MG/DL (ref 0.57–1)
DEPRECATED RDW RBC AUTO: 45.1 FL (ref 37–54)
EGFRCR SERPLBLD CKD-EPI 2021: 98.8 ML/MIN/1.73
EOSINOPHIL # BLD AUTO: 0 10*3/MM3 (ref 0–0.4)
EOSINOPHIL NFR BLD AUTO: 0.3 % (ref 0.3–6.2)
ERYTHROCYTE [DISTWIDTH] IN BLOOD BY AUTOMATED COUNT: 13.4 % (ref 12.3–15.4)
GLUCOSE SERPL-MCNC: 113 MG/DL (ref 65–99)
HCT VFR BLD AUTO: 33 % (ref 34–46.6)
HGB BLD-MCNC: 11.4 G/DL (ref 12–15.9)
LYMPHOCYTES # BLD AUTO: 0.7 10*3/MM3 (ref 0.7–3.1)
LYMPHOCYTES NFR BLD AUTO: 6.9 % (ref 19.6–45.3)
MCH RBC QN AUTO: 31.4 PG (ref 26.6–33)
MCHC RBC AUTO-ENTMCNC: 34.4 G/DL (ref 31.5–35.7)
MCV RBC AUTO: 91.1 FL (ref 79–97)
MONOCYTES # BLD AUTO: 0.6 10*3/MM3 (ref 0.1–0.9)
MONOCYTES NFR BLD AUTO: 6.4 % (ref 5–12)
NEUTROPHILS NFR BLD AUTO: 8.2 10*3/MM3 (ref 1.7–7)
NEUTROPHILS NFR BLD AUTO: 86.1 % (ref 42.7–76)
NRBC BLD AUTO-RTO: 0.1 /100 WBC (ref 0–0.2)
PLATELET # BLD AUTO: 204 10*3/MM3 (ref 140–450)
PMV BLD AUTO: 9.6 FL (ref 6–12)
POTASSIUM SERPL-SCNC: 3.3 MMOL/L (ref 3.5–5.2)
POTASSIUM SERPL-SCNC: 3.7 MMOL/L (ref 3.5–5.2)
RBC # BLD AUTO: 3.62 10*6/MM3 (ref 3.77–5.28)
SODIUM SERPL-SCNC: 133 MMOL/L (ref 136–145)
WBC NRBC COR # BLD: 9.5 10*3/MM3 (ref 3.4–10.8)

## 2023-11-10 PROCEDURE — 25010000002 CEFTRIAXONE PER 250 MG: Performed by: FAMILY MEDICINE

## 2023-11-10 PROCEDURE — 84132 ASSAY OF SERUM POTASSIUM: CPT | Performed by: STUDENT IN AN ORGANIZED HEALTH CARE EDUCATION/TRAINING PROGRAM

## 2023-11-10 PROCEDURE — 36415 COLL VENOUS BLD VENIPUNCTURE: CPT | Performed by: STUDENT IN AN ORGANIZED HEALTH CARE EDUCATION/TRAINING PROGRAM

## 2023-11-10 PROCEDURE — 25010000002 AZITHROMYCIN PER 500 MG: Performed by: FAMILY MEDICINE

## 2023-11-10 PROCEDURE — 25010000002 ENOXAPARIN PER 10 MG: Performed by: PHYSICIAN ASSISTANT

## 2023-11-10 PROCEDURE — 25810000003 SODIUM CHLORIDE 0.9 % SOLUTION 250 ML FLEX CONT: Performed by: FAMILY MEDICINE

## 2023-11-10 PROCEDURE — 25810000003 SODIUM CHLORIDE 0.9 % SOLUTION: Performed by: STUDENT IN AN ORGANIZED HEALTH CARE EDUCATION/TRAINING PROGRAM

## 2023-11-10 PROCEDURE — 80048 BASIC METABOLIC PNL TOTAL CA: CPT | Performed by: STUDENT IN AN ORGANIZED HEALTH CARE EDUCATION/TRAINING PROGRAM

## 2023-11-10 PROCEDURE — 85025 COMPLETE CBC W/AUTO DIFF WBC: CPT | Performed by: STUDENT IN AN ORGANIZED HEALTH CARE EDUCATION/TRAINING PROGRAM

## 2023-11-10 RX ORDER — POTASSIUM CHLORIDE 20 MEQ/1
40 TABLET, EXTENDED RELEASE ORAL EVERY 4 HOURS
Status: COMPLETED | OUTPATIENT
Start: 2023-11-10 | End: 2023-11-10

## 2023-11-10 RX ADMIN — POTASSIUM CHLORIDE 40 MEQ: 1500 TABLET, EXTENDED RELEASE ORAL at 10:26

## 2023-11-10 RX ADMIN — SODIUM CHLORIDE 100 ML/HR: 9 INJECTION, SOLUTION INTRAVENOUS at 10:27

## 2023-11-10 RX ADMIN — Medication 5 MG: at 00:50

## 2023-11-10 RX ADMIN — ACETAMINOPHEN 650 MG: 325 TABLET, FILM COATED ORAL at 10:27

## 2023-11-10 RX ADMIN — ACETAMINOPHEN 650 MG: 325 TABLET, FILM COATED ORAL at 21:01

## 2023-11-10 RX ADMIN — Medication 10 ML: at 10:25

## 2023-11-10 RX ADMIN — ACETAMINOPHEN 650 MG: 325 TABLET, FILM COATED ORAL at 00:50

## 2023-11-10 RX ADMIN — CEFTRIAXONE 2000 MG: 2 INJECTION, POWDER, FOR SOLUTION INTRAMUSCULAR; INTRAVENOUS at 14:51

## 2023-11-10 RX ADMIN — ENOXAPARIN SODIUM 40 MG: 100 INJECTION SUBCUTANEOUS at 15:39

## 2023-11-10 RX ADMIN — AZITHROMYCIN MONOHYDRATE 500 MG: 500 INJECTION, POWDER, LYOPHILIZED, FOR SOLUTION INTRAVENOUS at 15:39

## 2023-11-10 RX ADMIN — POTASSIUM CHLORIDE 40 MEQ: 1500 TABLET, EXTENDED RELEASE ORAL at 03:12

## 2023-11-10 RX ADMIN — ATORVASTATIN CALCIUM 40 MG: 40 TABLET, FILM COATED ORAL at 10:26

## 2023-11-10 NOTE — CASE MANAGEMENT/SOCIAL WORK
Continued Stay Note  [facility]     Patient Name: Tory Alston  MRN: [id number]  Today's Date: 11/10/2023    Admit Date: 11/8/2023    Plan: Home with family   Discharge Plan       Row Name 11/10/23 1136       Plan    Plan Home with family    Provided Post Acute Provider List? N/A    Provided Post Acute Provider Quality & Resource List? N/A    Plan Comments D/C barriers: IV antibiotics, replacing potassium, IVF.                 Expected Discharge Date and Time       Expected Discharge Date Expected Discharge Time    Nov 12, 2023               Birgit Mcarthur RN     [address] [address]  Phone: [phone]  Fax: [phone]

## 2023-11-10 NOTE — PROGRESS NOTES
[facility]     Progress Note    Patient Name: Tory Alston  : 1960  MRN: [id number]  Primary Care Physician:  Margarita Erazo, APRN  Date of admission: 2023    Subjective     Admitted for COVID-pneumonia with a positive Legionella screening as well  Doing okay since admission.  She was febrile with a Tmax of 101.8 °F overnight.  Potassium is a bit low this morning.  She had elevated procalcitonin and CRP levels on admission.  She also had a couple low blood pressures overnight, but this looks a lot there this morning.  She is on 2 L nasal cannula.  Discussed the case with the bedside nursing staff. The patient's case was also discussed with the pharmacy and case management providers at multidisciplinary rounds.  No other acute concerns.    Objective     Vitals:   Temp:  [97.5 °F (36.4 °C)-101.8 °F (38.8 °C)] 97.5 °F (36.4 °C)  Heart Rate:  [56-83] 64  Resp:  [12-20] 16  BP: (100-142)/(56-75) 122/75  Flow (L/min):  [2] 2    Physical Exam   GEN: WDWN, no acute distress  HEENT: NCAT, PERRLA, moist mucous membranes  NECK: Supple, midline trachea  CARD: RRR, no appreciable M/R/G, no peripheral edema  PULM: Coarse bilateral breath sounds, diminished at the bases, non-distressed  ABD: soft, NTND, normoactive bowel sounds throughout  NEURO: Grossly intact, non-focal exam  PSYCH: Pleasant    Result Review   I have personally reviewed the results from the time of this admission to 11/10/2023 07:56 EST and agree with these findings:  [x]  Laboratory   [x]  Microbiology  [x]  Radiology  [x]  EKG/Telemetry   [x]  Cardiology/Vascular   []  Pathology  [x]  Old records  []  Other:    XR Chest 1 View    Result Date: 2023  XR CHEST 1 VW Date of Exam: 2023 12:45 PM EST Indication: covid Comparison: None. Airspace disease is seen within the left mid to lower lung zone suggestive of pneumonia. Right lung is clear. Heart size is normal. No significant pleural fluid collection is identified. There is no  pneumothorax     Impression: Impression: Findings are consistent with the appearance of left lower lobe pneumonia. Radiographic follow-up after trial of therapy is recommended to document resolution. Electronically Signed: Christy Mai MD  11/8/2023 1:08 PM EST  Workstation ID: [id number]     CT Angiogram Chest Pulmonary Embolism    Result Date: 11/8/2023  CT ANGIOGRAM CHEST PULMONARY EMBOLISM Date of Exam: 11/8/2023 4:15 PM EST Indication: pain. Positive COVID-19. Cough weakness. Elevated D-dimer. Comparison: Chest x-ray 11/8/2023 Technique: Axial CT images were obtained of the chest after the uneventful intravenous administration of iodinated contrast utilizing pulmonary embolism protocol.  Sagittal and coronal reconstructions were performed.  Automated exposure control and iterative reconstruction methods were used. Findings: Pulmonary Arteries: Excellent contrast opacification of the pulmonary arteries.  No intraluminal filling defects to suggest pulmonary embolus.  The pulmonary arteries are normal in caliber. Hilum and Mediastinum: No pathologically enlarged lymph nodes. Hilar and mediastinal nodes are upper limits of normal in size. For instance 9 mm left infrahilar lymph node on image #83. Normal heart size.  No significant coronary artery atherosclerotic disease. Unremarkable thoracic aorta.   No pericardial effusion. Lung Parenchyma and Pleura: There is consolidation in the left lower lobe consistent with pneumonia. Mild bronchial wall thickening. Trace size left pleural effusion..  No suspicious pulmonary nodules.  No endobronchial lesions.  No significant pleural effusions. Upper Abdomen: Mild hepatic steatosis.. Soft tissues: Unremarkable. Osseous structures: No aggressive focal lytic or sclerotic osseous lesions.     Impression: 1.No evidence of pulmonary embolus. 2.Left lower lobe pneumonia. Borderline enlarged hilar mediastinal lymph nodes. Trace size left pleural effusion. 3.Mild hepatic steatosis.  Electronically Signed: Anum Salas MD  11/8/2023 4:33 PM EST  Workstation ID: [id number]     Assessment / Plan     Active Hospital Problems:  Active Hospital Problems    Diagnosis     **PNA (pneumonia)     Pneumonia     COVID-19 virus detected      Plan:   #Covid 19  #Legionella pneumonia    -Positive COVID test at home as well as here     - CT PE without PE but does shows LLL pneumonia with borderline enlarged hilar mediastinal lymph nodes and trace left pleural effusion    - suspect reactive lymphadenopathy    - Legionella screening positive      - Rocephin 2g IV/azithromycin 500 mg IV daily, extend course to 5 days given Legionella    - Needed a little oxygen overnight, currently on room air     - Symbicort BID    - Consider COVID specific therapy f situation escalates    - EKG NRS without gross ischemia    - Tylenol PRN fever      #Hyponatremia    - sodium 133 and Cl 96 on admission    - NS @ 100/hr    - suspect 2/2 volume depletion    - monitor labs     #HLD    -resume home statin    #Current smoker   - Smoking cessation is highly recommended       DVT prophylaxis:  Medical DVT prophylaxis orders are present.    CODE STATUS:    Code Status (Patient has no pulse and is not breathing): CPR (Attempt to Resuscitate)  Medical Interventions (Patient has pulse or is breathing): Full Support    Disposition:  I expect patient to be discharged home in couple days.    Electronically signed by Nirav Aguilera MD, 11/10/23, 10:00 EST.  [facility]ist Team

## 2023-11-10 NOTE — PLAN OF CARE
Goal Outcome Evaluation:      Pt. Has been resting throughout the shift with minimal complaints. Pt. Complained of headache early shift; treated per MAR. Pt. Has been walking around the room throughout the shift. Pt. Is on to IV abx. Pt. Has been on RA throughout the shift.

## 2023-11-10 NOTE — PLAN OF CARE
Problem: Respiratory Compromise (Pneumonia)  Goal: Effective Oxygenation and Ventilation  Intervention: Promote Airway Secretion Clearance  Recent Flowsheet Documentation  Taken 11/9/2023 1908 by Cheng Mistry RN  Cough And Deep Breathing: done independently per patient  Intervention: Optimize Oxygenation and Ventilation  Recent Flowsheet Documentation  Taken 11/9/2023 1908 by Cheng Mistry RN  Head of Bed (HOB) Positioning: HOB elevated  Airway/Ventilation Management:   airway patency maintained   calming measures promoted   Goal Outcome Evaluation:         Patient alert and oriented, on RA. Complaint of head ache, with elevated temperature, medication per MAR. Able to verbalize wants and needs. Continent of bowel and bladder. Vital signs checked. Call light within reach.

## 2023-11-11 ENCOUNTER — READMISSION MANAGEMENT (OUTPATIENT)
Dept: CALL CENTER | Facility: HOSPITAL | Age: 63
End: 2023-11-11
Payer: MEDICARE

## 2023-11-11 VITALS
HEIGHT: 69 IN | DIASTOLIC BLOOD PRESSURE: 75 MMHG | SYSTOLIC BLOOD PRESSURE: 139 MMHG | HEART RATE: 80 BPM | OXYGEN SATURATION: 96 % | BODY MASS INDEX: 25.93 KG/M2 | WEIGHT: 175.04 LBS | TEMPERATURE: 99.6 F | RESPIRATION RATE: 25 BRPM

## 2023-11-11 PROBLEM — D89.832 CYTOKINE RELEASE SYNDROME, GRADE 2: Status: ACTIVE | Noted: 2023-11-11

## 2023-11-11 PROBLEM — J12.82 PNEUMONIA DUE TO COVID-19 VIRUS: Status: ACTIVE | Noted: 2023-11-08

## 2023-11-11 PROBLEM — F17.200 CURRENT SMOKER: Chronic | Status: ACTIVE | Noted: 2023-11-11

## 2023-11-11 PROBLEM — A48.1 LEGIONELLA PNEUMONIA: Status: ACTIVE | Noted: 2023-11-11

## 2023-11-11 PROCEDURE — 25810000003 SODIUM CHLORIDE 0.9 % SOLUTION: Performed by: STUDENT IN AN ORGANIZED HEALTH CARE EDUCATION/TRAINING PROGRAM

## 2023-11-11 RX ORDER — AZITHROMYCIN 250 MG/1
500 TABLET, FILM COATED ORAL DAILY
Status: DISCONTINUED | OUTPATIENT
Start: 2023-11-11 | End: 2023-11-11 | Stop reason: HOSPADM

## 2023-11-11 RX ORDER — CEFDINIR 300 MG/1
300 CAPSULE ORAL EVERY 12 HOURS SCHEDULED
Qty: 8 CAPSULE | Refills: 0 | Status: DISCONTINUED | OUTPATIENT
Start: 2023-11-11 | End: 2023-11-11

## 2023-11-11 RX ORDER — CEFDINIR 300 MG/1
300 CAPSULE ORAL 2 TIMES DAILY
Qty: 8 CAPSULE | Refills: 0 | Status: SHIPPED | OUTPATIENT
Start: 2023-11-11 | End: 2023-11-15

## 2023-11-11 RX ORDER — CEFDINIR 300 MG/1
300 CAPSULE ORAL EVERY 12 HOURS SCHEDULED
Status: DISCONTINUED | OUTPATIENT
Start: 2023-11-11 | End: 2023-11-11 | Stop reason: HOSPADM

## 2023-11-11 RX ORDER — AZITHROMYCIN 500 MG/1
500 TABLET, FILM COATED ORAL DAILY
Qty: 4 TABLET | Refills: 0 | Status: SHIPPED | OUTPATIENT
Start: 2023-11-11 | End: 2023-11-15

## 2023-11-11 RX ADMIN — ATORVASTATIN CALCIUM 40 MG: 40 TABLET, FILM COATED ORAL at 08:26

## 2023-11-11 RX ADMIN — SODIUM CHLORIDE 100 ML/HR: 9 INJECTION, SOLUTION INTRAVENOUS at 00:36

## 2023-11-11 NOTE — PLAN OF CARE
Continue to monitor and assess patient's pain.  Patient is ready to go home.  Problem: Fluid Imbalance (Pneumonia)  Goal: Fluid Balance  Outcome: Ongoing, Progressing  Intervention: Monitor and Manage Fluid Balance  Flowsheets (Taken 11/11/2023 0711)  Fluid/Electrolyte Management: fluids provided     Problem: Infection (Pneumonia)  Goal: Resolution of Infection Signs and Symptoms  Outcome: Ongoing, Progressing  Intervention: Prevent Infection Progression  Flowsheets (Taken 11/11/2023 0711)  Isolation Precautions:   precautions maintained   enhanced contact   droplet     Problem: Respiratory Compromise (Pneumonia)  Goal: Effective Oxygenation and Ventilation  Outcome: Ongoing, Progressing  Intervention: Promote Airway Secretion Clearance  Flowsheets (Taken 11/11/2023 0711)  Cough And Deep Breathing: done independently per patient  Intervention: Optimize Oxygenation and Ventilation  Flowsheets  Taken 11/10/2023 2300 by Merissa Diaz PCT  Head of Bed (HOB) Positioning: HOB elevated  Taken 11/9/2023 1908 by Cheng Mistry, RN  Airway/Ventilation Management:   airway patency maintained   calming measures promoted   Goal Outcome Evaluation:

## 2023-11-11 NOTE — DISCHARGE SUMMARY
[facility] Medicine Services  Discharge Summary    Date of Service: 2023  Patient Name: Tory Alston  : 1960  MRN: [id number]    Date of Admission: 2023  Discharge Diagnosis: See below  Date of Discharge: 23  Primary Care Physician: Margarita Erazo APRN    Presenting Problem:   Pleurodynia [R07.81]  Pneumonia [J18.9]  PNA (pneumonia) [J18.9]  Pneumonia of left lower lobe due to infectious organism [J18.9]  COVID [U07.1]    Active and Resolved Hospital Problems:  Active Hospital Problems    Diagnosis POA    **Legionella pneumonia [A48.1] Yes    Current smoker [F17.200] Yes    Pneumonia due to COVID-19 virus [U07.1, J12.82] Yes    Posttraumatic stress disorder [F43.10] Yes    Mixed hyperlipidemia [E78.2] Yes    Anxiety and depression [F41.9, F32.A] Yes      Resolved Hospital Problems   No resolved problems to display.     Hospital Course     HPI:  Tory Alston is a 63 y.o. female who presented to [facility] on 2023 complaining of dyspnea.  Admits to worsening dyspnea over the past week complicated by fevers and chills complicated by pleuritic chest pain with decreased intake.  Denies n/v/d, productive cough, abdominal pain.  She took a home Covid test today which was positive.  Due to continued symptoms she presented to [facility] for evaluation.       In the ED, vitals 99.2F, HR 83, RR 23, /45, 91% RA.  Labs notable for sodium 133, Cl 96, glucose 158, procal 0.74, d-dimer 0.96, lactate 1.0, wbc 18.6, 6% bands.  CT PE without PE but does shows LLL pneumonia.  CVP positive for covid.  She is to be admitted for treatment of pneumonia and covid.     Hospital course:  She was admitted for further evaluation and treatment.  She responded well to antibiotics and aggressive pulmonary toilet.  Screening was also positive for Legionella pneumonia.  No COVID-specific therapy was warranted.  She received some potassium replacement.  Smoking cessation is highly  recommended.  She did not require home oxygen at the time of discharge.  She improved over the course of a couple of days and will discharge home today with close outpatient primary care follow-up.    Day of Discharge     Vital Signs:  Temp:  [97.5 °F (36.4 °C)-102.6 °F (39.2 °C)] 99.9 °F (37.7 °C)  Heart Rate:  [68-91] 78  Resp:  [11-35] 28  BP: ()/(63-77) 139/75    Physical Exam:  GEN: WDWN, no acute distress  HEENT: NCAT, PERRLA, moist mucous membranes  NECK: Supple, midline trachea  CARD: RRR, no appreciable M/R/G, no peripheral edema  PULM: Coarse bilateral breath sounds, diminished at the bases, non-distressed  ABD: soft, NTND, normoactive bowel sounds throughout  NEURO: Grossly intact, non-focal exam  PSYCH: Pleasant    Pertinent  and/or Most Recent Results     LAB RESULTS:      Lab 11/10/23  0117 11/09/23 1136 11/08/23 2025 11/08/23  1432 11/08/23  1142   WBC 9.50 14.80*  --   --  18.60*   HEMOGLOBIN 11.4* 11.5*  --   --  14.1   HEMATOCRIT 33.0* 34.4  --   --  42.7   PLATELETS 204 195  --   --  237   NEUTROS ABS 8.20* 13.10*  --   --  17.67*   LYMPHS ABS 0.70 0.80  --   --   --    MONOS ABS 0.60 0.80  --   --   --    EOS ABS 0.00 0.00  --   --   --    MCV 91.1 93.2  --   --  94.3   CRP  --   --  20.57*  --   --    PROCALCITONIN  --   --  1.47*  --  0.74*   LACTATE  --   --   --  1.0  --          Lab 11/10/23  1246 11/10/23  0117 11/09/23 1136 11/08/23  1142   SODIUM  --  133* 133* 133*   POTASSIUM 3.7 3.3* 3.7 3.6   CHLORIDE  --  102 101 96*   CO2  --  21.0* 23.0 25.0   ANION GAP  --  10.0 9.0 12.0   BUN  --  17 21 17   CREATININE  --  0.64 0.88 0.94   EGFR  --  98.8 73.5 67.9   GLUCOSE  --  113* 112* 158*   CALCIUM  --  8.0* 8.0* 9.2         Lab 11/08/23  1142   TOTAL PROTEIN 7.3   ALBUMIN 3.9   GLOBULIN 3.4   ALT (SGPT) 6   AST (SGOT) 12   BILIRUBIN 0.7   ALK PHOS 76                 Lab 11/08/23 2025   FERRITIN 804.60*         Brief Urine Lab Results       None          Microbiology Results (last  10 days)       Procedure Component Value - Date/Time    Legionella Antigen, Urine - Urine, Urine, Clean Catch [[id number]]  (Abnormal) Collected: 11/09/23 0259    Lab Status: Final result Specimen: Urine, Clean Catch Updated: 11/09/23 0325     LEGIONELLA ANTIGEN, URINE Positive    S. Pneumo Ag Urine or CSF - Urine, Urine, Clean Catch [[id number]]  (Normal) Collected: 11/09/23 0259    Lab Status: Final result Specimen: Urine, Clean Catch Updated: 11/09/23 0325     Strep Pneumo Ag Negative    Respiratory Panel PCR w/COVID-19(SARS-CoV-2) DONALDO/HECTOR/CARMEN/PAD/COR/MAD/LILLIE In-House, NP Swab in UTM/VTM, 3-4 HR TAT - Swab, Nasopharynx [[id number]]  (Abnormal) Collected: 11/08/23 2107    Lab Status: Final result Specimen: Swab from Nasopharynx Updated: 11/08/23 2256     ADENOVIRUS, PCR Not Detected     Coronavirus 229E Not Detected     Coronavirus HKU1 Not Detected     Coronavirus NL63 Not Detected     Coronavirus OC43 Not Detected     COVID19 Detected     Human Metapneumovirus Not Detected     Human Rhinovirus/Enterovirus Not Detected     Influenza A PCR Not Detected     Influenza B PCR Not Detected     Parainfluenza Virus 1 Not Detected     Parainfluenza Virus 2 Not Detected     Parainfluenza Virus 3 Not Detected     Parainfluenza Virus 4 Not Detected     RSV, PCR Not Detected     Bordetella pertussis pcr Not Detected     Bordetella parapertussis PCR Not Detected     Chlamydophila pneumoniae PCR Not Detected     Mycoplasma pneumo by PCR Not Detected    Narrative:      In the setting of a positive respiratory panel with a viral infection PLUS a negative procalcitonin without other underlying concern for bacterial infection, consider observing off antibiotics or discontinuation of antibiotics and continue supportive care. If the respiratory panel is positive for atypical bacterial infection (Bordetella pertussis, Chlamydophila pneumoniae, or Mycoplasma pneumoniae), consider antibiotic de-escalation to target atypical bacterial  infection.    Blood Culture - Blood, Arm, Left [[id number]]  (Normal) Collected: 11/08/23 2032    Lab Status: Preliminary result Specimen: Blood from Arm, Left Updated: 11/10/23 2045     Blood Culture No growth at 2 days    Blood Culture - Blood, Arm, Right [[id number]]  (Normal) Collected: 11/08/23 2025    Lab Status: Preliminary result Specimen: Blood from Arm, Right Updated: 11/10/23 2045     Blood Culture No growth at 2 days    Blood Culture - Blood, Arm, Right [[id number]]  (Normal) Collected: 11/08/23 1422    Lab Status: Preliminary result Specimen: Blood from Arm, Right Updated: 11/10/23 1430     Blood Culture No growth at 2 days    Blood Culture - Blood, Arm, Left [[id number]]  (Normal) Collected: 11/08/23 1421    Lab Status: Preliminary result Specimen: Blood from Arm, Left Updated: 11/10/23 1430     Blood Culture No growth at 2 days    Narrative:      Less than seven (7) mL's of blood was collected.  Insufficient quantity may yield false negative results.            CT Angiogram Chest Pulmonary Embolism    Result Date: 11/8/2023  Impression: 1.No evidence of pulmonary embolus. 2.Left lower lobe pneumonia. Borderline enlarged hilar mediastinal lymph nodes. Trace size left pleural effusion. 3.Mild hepatic steatosis. Electronically Signed: Anum Salas MD  11/8/2023 4:33 PM EST  Workstation ID: [id number]    XR Chest 1 View    Result Date: 11/8/2023  Impression: Impression: Findings are consistent with the appearance of left lower lobe pneumonia. Radiographic follow-up after trial of therapy is recommended to document resolution. Electronically Signed: Christy Mai MD  11/8/2023 1:08 PM EST  Workstation ID: [id number]         Results for orders placed during the hospital encounter of 03/10/23    Adult Transthoracic Echo Complete W/ Cont if Necessary Per Protocol    Interpretation Summary    Left ventricular systolic function is normal. Calculated left ventricular EF = 69% Left ventricular ejection fraction appears  to be 66 - 70%.    Left ventricular diastolic function is consistent with (grade I) impaired relaxation.    No significant valvular abnormalities.      Labs Pending at Discharge:  Pending Labs       Order Current Status    Blood Culture - Blood, Arm, Left Preliminary result    Blood Culture - Blood, Arm, Left Preliminary result    Blood Culture - Blood, Arm, Right Preliminary result    Blood Culture - Blood, Arm, Right Preliminary result              Consults:   Consults       Date and Time Order Name Status Description    11/8/2023  4:51 PM Hospitalist (on-call MD unless specified)              Discharge Details        Discharge Medications        New Medications        Instructions Start Date   azithromycin 250 MG tablet  Commonly known as: ZITHROMAX   500 mg, Oral, Daily, Take at 6 PM      cefdinir 300 MG capsule  Commonly known as: OMNICEF   300 mg, Oral, 2 Times Daily, Starting at 6 PM             Continue These Medications        Instructions Start Date   atorvastatin 40 MG tablet  Commonly known as: LIPITOR   TAKE 1 TABLET BY MOUTH EVERY DAY      methylPREDNISolone 4 MG dose pack  Commonly known as: MEDROL   TAKE 6 TABLETS ON DAY 1 AS DIRECTED ON PACKAGE AND DECREASE BY 1 TAB EACH DAY FOR A TOTAL OF 6 DAYS               No Known Allergies    Discharge Disposition:   Home or Self Care    Diet:  Hospital:  Diet Order   Procedures    Diet: Regular/House Diet; Texture: Regular Texture (IDDSI 7); Fluid Consistency: Thin (IDDSI 0)       Discharge Activity:   Activity Instructions       Activity as Tolerated              CODE STATUS:  Code Status and Medical Interventions:   Ordered at: 11/08/23 1854     Code Status (Patient has no pulse and is not breathing):    CPR (Attempt to Resuscitate)     Medical Interventions (Patient has pulse or is breathing):    Full Support         Future Appointments   Date Time Provider Department Center   9/18/2024  8:30 AM Margarita Erazo APRN [facility]       Additional  Instructions for the Follow-ups that You Need to Schedule       Call MD With Problems / Concerns   As directed      Instructions: Primary care provider    Order Comments: Instructions: Primary care provider         Discharge Follow-up with PCP   As directed       Currently Documented PCP:    Margarita Erazo APRN    PCP Phone Number:    [phone]     Follow Up Details: Within one week of discharge.              Time spent on Discharge including face to face service: 25 minutes    Signature: Electronically signed by Nirav Aguilera MD, 11/11/23, 07:49 EST.  [facility] Hospitalist Team

## 2023-11-11 NOTE — OUTREACH NOTE
Prep Survey      Flowsheet Row Responses   [facility] discharged from? [facility]   Is LACE score < 7 ? Yes   [facility]   Date of Admission 11/08/23   Date of Discharge 11/11/23   Discharge Disposition Home or Self Care   Discharge diagnosis Legionella pneumonia   Does the patient have one of the following disease processes/diagnoses(primary or secondary)? Pneumonia   Does the patient have Home health ordered? No   Is there a DME ordered? No   Medication alerts for this patient Zithromax, Cefdinir   Prep survey completed? Yes            Jessica CANALES - Registered Nurse

## 2023-11-11 NOTE — CASE MANAGEMENT/SOCIAL WORK
Case Management Discharge Note      Final Note: home    Provided Post Acute Provider List?: N/A  Provided Post Acute Provider Quality & Resource List?: N/A    Selected Continued Care - Discharged on 11/11/2023 Admission date: 11/8/2023 - Discharge disposition: Home or Self Care       Transportation Services  Private: Car    Final Discharge Disposition Code: 01 - home or self-care

## 2023-11-13 ENCOUNTER — TRANSITIONAL CARE MANAGEMENT TELEPHONE ENCOUNTER (OUTPATIENT)
Dept: CALL CENTER | Facility: HOSPITAL | Age: 63
End: 2023-11-13
Payer: MEDICARE

## 2023-11-13 LAB
BACTERIA SPEC AEROBE CULT: NORMAL

## 2023-11-13 NOTE — OUTREACH NOTE
Call Center TCM Note      Flowsheet Row Responses   [facility] patient discharged from? [facility]   Does the patient have one of the following disease processes/diagnoses(primary or secondary)? Pneumonia   TCM attempt successful? Yes   Call start time 0939   Call end time 0944   Discharge diagnosis Legionella pneumonia   Is patient permission given to speak with other caregiver? No  [no one listed on verbal release.]   Person spoke with today (if not patient) and relationship Patient   Medication alerts for this patient Zithromax, Cefdinir   Meds reviewed with patient/caregiver? Yes   Is the patient having any side effects they believe may be caused by any medication additions or changes? No   Does the patient have all medications ordered at discharge? Yes   Is the patient taking all medications as directed (includes completed medication regime)? Yes   Comments Patient states due to her insurance she cannot see her current PCP. She is currently changing insurance providers so that she can stay with AALIYAH Funes.   Does the patient have an appointment with their PCP within 7-14 days of discharge? No   Nursing Interventions Patient declined scheduling/rescheduling appointment at this time   Has home health visited the patient within 72 hours of discharge? N/A   Pulse Ox monitoring None   Psychosocial issues? No   Comments Patient states she is doing much better. The antibiotics are finally kicking in.   Did the patient receive a copy of their discharge instructions? Yes   Nursing interventions Reviewed instructions with patient   What is the patient's perception of their health status since discharge? Improving   Is the patient/caregiver able to teach back the hierarchy of who to call/visit for symptoms/problems? PCP, Specialist, Home health nurse, Urgent Care, ED, 911 Yes   Is the patient/caregiver able to teach back signs and symptoms of worsening condition: Fever/chills, Shortness of breath, Chest pain   Is  the patient/caregiver able to teach back importance of completing antibiotic course of treatment? Yes   TCM call completed? Yes   Wrap up additional comments No needs or concerns at this time.   Call end time 0944   Would this patient benefit from a Referral to [facility] Social Work? No   Is the patient interested in additional calls from an ambulatory ? No            Jessica Ho RN    11/13/2023, 09:45 EST

## 2023-12-10 RX ORDER — ATORVASTATIN CALCIUM 40 MG/1
TABLET, FILM COATED ORAL
Qty: 90 TABLET | Refills: 0 | Status: SHIPPED | OUTPATIENT
Start: 2023-12-10

## 2024-02-26 ENCOUNTER — HOSPITAL ENCOUNTER (EMERGENCY)
Facility: HOSPITAL | Age: 64
Discharge: HOME | End: 2024-02-26
Attending: EMERGENCY MEDICINE | Admitting: EMERGENCY MEDICINE
Payer: MEDICARE

## 2024-02-26 ENCOUNTER — APPOINTMENT (OUTPATIENT)
Dept: CT IMAGING | Facility: HOSPITAL | Age: 64
End: 2024-02-26
Payer: MEDICARE

## 2024-02-26 VITALS
SYSTOLIC BLOOD PRESSURE: 136 MMHG | RESPIRATION RATE: 18 BRPM | WEIGHT: 187.61 LBS | BODY MASS INDEX: 27.79 KG/M2 | HEART RATE: 70 BPM | DIASTOLIC BLOOD PRESSURE: 83 MMHG | OXYGEN SATURATION: 98 % | HEIGHT: 69 IN | TEMPERATURE: 96.7 F

## 2024-02-26 DIAGNOSIS — S16.1XXA STRAIN OF NECK MUSCLE, INITIAL ENCOUNTER: ICD-10-CM

## 2024-02-26 DIAGNOSIS — M54.2 NECK PAIN: ICD-10-CM
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
## 2024-02-26 PROCEDURE — 72125 CT NECK SPINE W/O DYE: CPT

## 2024-02-26 PROCEDURE — 99284 EMERGENCY DEPT VISIT MOD MDM: CPT

## 2024-02-26 RX ORDER — IBUPROFEN 400 MG/1
800 TABLET ORAL ONCE
Status: COMPLETED | OUTPATIENT
Start: 2024-02-26 | End: 2024-02-26

## 2024-02-26 RX ORDER — METHOCARBAMOL 750 MG/1
750 TABLET, FILM COATED ORAL 3 TIMES DAILY PRN
Qty: 15 TABLET | Refills: 0 | Status: SHIPPED | OUTPATIENT
Start: 2024-02-26

## 2024-02-26 RX ORDER — NAPROXEN 500 MG/1
500 TABLET ORAL 2 TIMES DAILY WITH MEALS
Qty: 20 TABLET | Refills: 0 | Status: SHIPPED | OUTPATIENT
Start: 2024-02-26

## 2024-02-26 RX ADMIN — IBUPROFEN 800 MG: 400 TABLET, FILM COATED ORAL at 07:09

## 2024-02-26 NOTE — DISCHARGE INSTRUCTIONS
Take medication as directed    Follow-up with your primary care provider in 3-5 days.  If you do not have a primary care provider call [phone] for help in finding one, or you may follow up with [facility] at [phone].    Return to ED for any new or worsening symptoms

## 2024-02-26 NOTE — ED PROVIDER NOTES
Subjective   History of Present Illness  Patient is a 64-year-old female presents to the ED with complaints of right-sided neck pain after fall just prior to arrival patient states that she was upstairs staying with her son who is cerebral palsy that was admitted for surgery.  Patient states she fell onto her knees but hit the right side of her neck on the chair she denies head injury or loss of consciousness.  She was able to eventually get up on her own.  No significant knee pain at this time.  Staff upstairs wanted her to come to be evaluated she does report moderate pain mainly along the right side of her neck with certain movements better with rest.  No new numbness or paresthesias of upper extremities.  Describes the pain is an achy type pain.  No headache or visual changes.  No chest pain or shortness of breath.  Denies any other pain from the incident.    History provided by:  Patient      Review of Systems   Constitutional: Negative.    Respiratory: Negative.     Cardiovascular: Negative.    Gastrointestinal:  Negative for abdominal distention, abdominal pain, nausea and vomiting.   Musculoskeletal:  Positive for neck pain. Negative for arthralgias and back pain.   Neurological:  Negative for seizures, syncope, weakness, numbness and headaches.       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    Legionella pneumonia 11/11/2023    PTSD (post-traumatic stress disorder)        No Known Allergies    Past Surgical History:   Procedure Laterality Date    LAPAROSCOPIC TUBAL LIGATION      1980       Family History   Problem Relation Age of Onset    COPD Mother     Other Brother         obesity    Heart disease Brother     Hyperlipidemia Brother     Hypertension Brother     Diabetes Brother        Social History     Socioeconomic History    Marital status: Single   Tobacco Use    Smoking status: Former     Types: Cigarettes     Smokeless tobacco: Never   Vaping Use    Vaping Use: Every day    Substances: Nicotine   Substance and Sexual Activity    Alcohol use: Not Currently     Comment: social    Drug use: Not Currently     Types: Marijuana     Comment: former    Sexual activity: Defer     Partners: Male     Comment: occasionaly           Objective   Physical Exam  Vitals and nursing note reviewed.   Constitutional:       General: She is not in acute distress.     Appearance: Normal appearance. She is well-developed. She is not ill-appearing, toxic-appearing or diaphoretic.   HENT:      Head: Normocephalic. No raccoon eyes, Cadena's sign, abrasion, contusion or laceration.      Nose: Nose normal.      Mouth/Throat:      Mouth: Mucous membranes are moist.      Pharynx: Oropharynx is clear.   Eyes:      General: No scleral icterus.     Extraocular Movements: Extraocular movements intact.      Pupils: Pupils are equal, round, and reactive to light.   Cardiovascular:      Rate and Rhythm: Normal rate and regular rhythm.      Heart sounds: No murmur heard.     No friction rub. No gallop.   Pulmonary:      Effort: Pulmonary effort is normal. No respiratory distress.      Breath sounds: Normal breath sounds. No stridor. No wheezing, rhonchi or rales.   Chest:      Chest wall: No tenderness.   Abdominal:      General: Bowel sounds are normal. There is no distension. There are no signs of injury.      Palpations: Abdomen is soft.      Tenderness: There is no abdominal tenderness. There is no guarding or rebound.   Musculoskeletal:      Cervical back: No rigidity. Pain with movement and muscular tenderness present. No spinous process tenderness. Decreased range of motion.   Skin:     General: Skin is warm.      Capillary Refill: Capillary refill takes less than 2 seconds.      Coloration: Skin is not cyanotic, jaundiced or pale.      Findings: No rash.   Neurological:      General: No focal deficit present.      Mental Status: She is alert and  "oriented to person, place, and time.      GCS: GCS eye subscore is 4. GCS verbal subscore is 5. GCS motor subscore is 6.   Psychiatric:         Mood and Affect: Mood normal.         Behavior: Behavior normal.         Procedures           ED Course      /74   Pulse 70   Temp 96.7 °F (35.9 °C) (Tympanic)   Resp 20   Ht 175.3 cm (69\")   Wt 85.1 kg (187 lb 9.8 oz)   SpO2 98%   BMI 27.71 kg/m²   Medications   ibuprofen (ADVIL,MOTRIN) tablet 800 mg (800 mg Oral Given 2/26/24 0709)     Labs Reviewed - No data to display  CT Cervical Spine Without Contrast   Final Result   Impression:      1. No acute osseous abnormality of the cervical spine.   2. Mild cervical degenerative disc disease and facet arthropathy.         Electronically Signed: Jessica Marc MD     2/26/2024 8:27 AM EST     Workstation ID: [id number]                                               Medical Decision Making  Chart Review: Discharge summary reviewed from 11/11/2023 initially seen for dyspnea was found to have pneumonia with antibiotics with improvement at time of discharge    Differentials: Fracture, contusion, sprain, strain      ;this list is not all inclusive and does not constitute the entirety of considered causes  Radiology: My interpretation no obvious fracture correlated with radiologist interpretation as below  CT Cervical Spine Without Contrast   Final Result    Impression:        1. No acute osseous abnormality of the cervical spine.    2. Mild cervical degenerative disc disease and facet arthropathy.            Electronically Signed: Jessica Marc MD      2/26/2024 8:27 AM EST      Workstation ID: [id number]     Disposition/Treatment:  Appropriate PPE was worn during exam and throughout all encounters with the patient.  When the ED patient was afebrile and appeared nontoxic presented after a fall earlier today with some right-sided neck pain.  Tender to palpation but no obvious swelling or bruising.  CT scan was obtained which " showed no acute osseous abnormalities she did have some degenerative changes as described above.  Patient requested nonnarcotic pain medicine.  She was given ibuprofen while in the ED she also had an ice pack.  Upon reassessment patient is resting quietly findings were discussed with the patient at bedside voiced understanding and discharge along with signs and symptoms to return.  They were in agreement plan all questions were answered.  Patient was given naproxen and Robaxin for home.    This document is intended for medical expert use only. Reading of this document by patients and/or patient's family without participating medical staff guidance may result in misinterpretation and unintended morbidity.  Any interpretation of such data is the responsibility of the patient and/or family member responsible for the patient in concert with their primary or specialist providers, not to be left for sources of online searches such as [employer], [employer] or similar queries. Relying on these approaches to knowledge may result in misinterpretation, misguided goals of care and even death should patients or family members try recommendations outside of the realm of professional medical care in a supervised inpatient environment.       Amount and/or Complexity of Data Reviewed  Radiology: ordered. Decision-making details documented in ED Course.    Risk  Prescription drug management.        Final diagnoses:   Fall, initial encounter   Neck pain   Strain of neck muscle, initial encounter       ED Disposition  ED Disposition       ED Disposition   Discharge    Condition   Stable    Comment   --               Margarita Erazo, APRN  [address]  [place] [address] [phone]    Schedule an appointment as soon as possible for a visit in 3 days      [facility] EMERGENCY DEPARTMENT  [address] [address] [phone]  Go to   If symptoms worsen         Medication List        New  Prescriptions      methocarbamol 750 MG tablet  Commonly known as: ROBAXIN  Take 1 tablet by mouth 3 (Three) Times a Day As Needed for Muscle Spasms.     naproxen 500 MG tablet  Commonly known as: NAPROSYN  Take 1 tablet by mouth 2 (Two) Times a Day With Meals.               Where to Get Your Medications        These medications were sent to [facility]/pharmacy #[id number] - [place], [place] - [address] - [phone] [facility] [phone]   [address] [place] [address]      Phone: [phone]   methocarbamol 750 MG tablet  naproxen 500 MG tablet            Ralph Branch PA  02/26/24 1166

## 2024-03-20 RX ORDER — ATORVASTATIN CALCIUM 40 MG/1
TABLET, FILM COATED ORAL
Qty: 90 TABLET | Refills: 0 | Status: SHIPPED | OUTPATIENT
Start: 2024-03-20

## 2024-06-21 RX ORDER — ATORVASTATIN CALCIUM 40 MG/1
TABLET, FILM COATED ORAL
Qty: 90 TABLET | Refills: 0 | OUTPATIENT
Start: 2024-06-21

## 2024-07-29 RX ORDER — ATORVASTATIN CALCIUM 40 MG/1
TABLET, FILM COATED ORAL
Qty: 90 TABLET | Refills: 0 | Status: SHIPPED | OUTPATIENT
Start: 2024-07-29

## 2024-09-23 ENCOUNTER — OFFICE VISIT (OUTPATIENT)
Dept: FAMILY MEDICINE CLINIC | Facility: CLINIC | Age: 64
End: 2024-09-23
Payer: MEDICARE

## 2024-09-23 VITALS
OXYGEN SATURATION: 97 % | HEART RATE: 80 BPM | SYSTOLIC BLOOD PRESSURE: 138 MMHG | DIASTOLIC BLOOD PRESSURE: 83 MMHG | TEMPERATURE: 97.9 F | WEIGHT: 199.4 LBS | HEIGHT: 69 IN | RESPIRATION RATE: 16 BRPM | BODY MASS INDEX: 29.53 KG/M2

## 2024-09-23 DIAGNOSIS — Z00.00 MEDICARE ANNUAL WELLNESS VISIT, SUBSEQUENT: Primary | ICD-10-CM

## 2024-09-23 DIAGNOSIS — E78.2 MIXED HYPERLIPIDEMIA: Chronic | ICD-10-CM

## 2024-09-23 DIAGNOSIS — E66.3 OVERWEIGHT WITH BODY MASS INDEX (BMI) OF 29 TO 29.9 IN ADULT: Chronic | ICD-10-CM

## 2024-09-23 PROBLEM — J12.82 PNEUMONIA DUE TO COVID-19 VIRUS: Status: RESOLVED | Noted: 2023-11-08 | Resolved: 2024-09-23

## 2024-09-23 PROBLEM — U07.1 PNEUMONIA DUE TO COVID-19 VIRUS: Status: RESOLVED | Noted: 2023-11-08 | Resolved: 2024-09-23

## 2024-09-23 PROBLEM — Z72.0 TOBACCO USE: Status: RESOLVED | Noted: 2023-02-14 | Resolved: 2024-09-23

## 2024-09-23 PROBLEM — J18.9 PNEUMONIA: Status: RESOLVED | Noted: 2023-11-09 | Resolved: 2024-09-23

## 2024-09-23 PROBLEM — J18.9 PNA (PNEUMONIA): Status: RESOLVED | Noted: 2023-11-08 | Resolved: 2024-09-23

## 2024-09-23 PROBLEM — A48.1 LEGIONELLA PNEUMONIA: Status: RESOLVED | Noted: 2023-11-11 | Resolved: 2024-09-23

## 2024-09-23 PROCEDURE — 1159F MED LIST DOCD IN RCRD: CPT | Performed by: NURSE PRACTITIONER

## 2024-09-23 PROCEDURE — 1126F AMNT PAIN NOTED NONE PRSNT: CPT | Performed by: NURSE PRACTITIONER

## 2024-09-23 PROCEDURE — G0439 PPPS, SUBSEQ VISIT: HCPCS | Performed by: NURSE PRACTITIONER

## 2024-09-23 PROCEDURE — 1160F RVW MEDS BY RX/DR IN RCRD: CPT | Performed by: NURSE PRACTITIONER

## 2024-10-24 ENCOUNTER — EXTERNAL PBMM DATA (OUTPATIENT)
Dept: PHARMACY | Facility: OTHER | Age: 64
End: 2024-10-24
Payer: MEDICARE

## 2024-11-11 ENCOUNTER — TELEPHONE (OUTPATIENT)
Dept: FAMILY MEDICINE CLINIC | Facility: CLINIC | Age: 64
End: 2024-11-11
Payer: MEDICARE

## 2024-11-11 RX ORDER — ATORVASTATIN CALCIUM 40 MG/1
TABLET, FILM COATED ORAL
Qty: 90 TABLET | Refills: 0 | OUTPATIENT
Start: 2024-11-11

## 2024-11-11 NOTE — TELEPHONE ENCOUNTER
Name: GamalielTory DREA    Relationship: Self    Best Callback Number:     [phone]       [facility]UB PROVIDED THE RELAY MESSAGE FROM THE OFFICE   PATIENT VOICED UNDERSTANDING AND HAS NO FURTHER QUESTIONS AT THIS TIME    ADDITIONAL INFORMATION: PATIENT HAS NOT HAD LABS DONE YET AND SHE IS NOT SCHEDULED TO GET THEM DONE BUT SHE IS HOPING TO GET THEM DONE SOON.

## 2024-11-23 ENCOUNTER — LAB (OUTPATIENT)
Dept: LAB | Facility: HOSPITAL | Age: 64
End: 2024-11-23
Payer: MEDICARE

## 2024-11-23 DIAGNOSIS — Z00.00 MEDICARE ANNUAL WELLNESS VISIT, SUBSEQUENT: ICD-10-CM

## 2024-11-23 DIAGNOSIS — E78.2 MIXED HYPERLIPIDEMIA: Chronic | ICD-10-CM

## 2024-11-23 DIAGNOSIS — E66.3 OVERWEIGHT WITH BODY MASS INDEX (BMI) OF 29 TO 29.9 IN ADULT: Chronic | ICD-10-CM

## 2024-11-23 LAB
ALBUMIN SERPL-MCNC: 4.4 G/DL (ref 3.5–5.2)
ALBUMIN/GLOB SERPL: 1.7 G/DL
ALP SERPL-CCNC: 101 U/L (ref 39–117)
ALT SERPL W P-5'-P-CCNC: 15 U/L (ref 1–33)
ANION GAP SERPL CALCULATED.3IONS-SCNC: 13 MMOL/L (ref 5–15)
AST SERPL-CCNC: 20 U/L (ref 1–32)
BILIRUB SERPL-MCNC: 0.5 MG/DL (ref 0–1.2)
BUN SERPL-MCNC: 17 MG/DL (ref 8–23)
BUN/CREAT SERPL: 19.1 (ref 7–25)
CALCIUM SPEC-SCNC: 9.3 MG/DL (ref 8.6–10.5)
CHLORIDE SERPL-SCNC: 99 MMOL/L (ref 98–107)
CHOLEST SERPL-MCNC: 160 MG/DL (ref 0–200)
CO2 SERPL-SCNC: 24 MMOL/L (ref 22–29)
CREAT SERPL-MCNC: 0.89 MG/DL (ref 0.57–1)
EGFRCR SERPLBLD CKD-EPI 2021: 72.1 ML/MIN/1.73
GLOBULIN UR ELPH-MCNC: 2.6 GM/DL
GLUCOSE SERPL-MCNC: 81 MG/DL (ref 65–99)
HBA1C MFR BLD: 5.9 % (ref 4.8–5.6)
HDLC SERPL-MCNC: 67 MG/DL (ref 40–60)
LDLC SERPL CALC-MCNC: 81 MG/DL (ref 0–100)
LDLC/HDLC SERPL: 1.21 {RATIO}
POTASSIUM SERPL-SCNC: 3.9 MMOL/L (ref 3.5–5.2)
PROT SERPL-MCNC: 7 G/DL (ref 6–8.5)
SODIUM SERPL-SCNC: 136 MMOL/L (ref 136–145)
TRIGL SERPL-MCNC: 61 MG/DL (ref 0–150)
VLDLC SERPL-MCNC: 12 MG/DL (ref 5–40)

## 2024-11-23 PROCEDURE — 36415 COLL VENOUS BLD VENIPUNCTURE: CPT

## 2024-11-23 PROCEDURE — 83036 HEMOGLOBIN GLYCOSYLATED A1C: CPT

## 2024-11-23 PROCEDURE — 80053 COMPREHEN METABOLIC PANEL: CPT

## 2024-11-23 PROCEDURE — 80061 LIPID PANEL: CPT

## 2024-11-28 RX ORDER — ATORVASTATIN CALCIUM 40 MG/1
40 TABLET, FILM COATED ORAL DAILY
Qty: 90 TABLET | Refills: 0 | Status: SHIPPED | OUTPATIENT
Start: 2024-11-28

## 2025-02-24 RX ORDER — ATORVASTATIN CALCIUM 40 MG/1
40 TABLET, FILM COATED ORAL DAILY
Qty: 90 TABLET | Refills: 0 | Status: SHIPPED | OUTPATIENT
Start: 2025-02-24

## 2025-03-24 ENCOUNTER — OFFICE VISIT (OUTPATIENT)
Dept: FAMILY MEDICINE CLINIC | Facility: CLINIC | Age: 65
End: 2025-03-24
Payer: MEDICARE

## 2025-03-24 VITALS
OXYGEN SATURATION: 95 % | HEIGHT: 69 IN | HEART RATE: 74 BPM | DIASTOLIC BLOOD PRESSURE: 87 MMHG | BODY MASS INDEX: 28.58 KG/M2 | SYSTOLIC BLOOD PRESSURE: 138 MMHG | WEIGHT: 193 LBS

## 2025-03-24 DIAGNOSIS — T78.40XS ALLERGY, SEQUELA: Chronic | ICD-10-CM

## 2025-03-24 DIAGNOSIS — E78.2 MIXED HYPERLIPIDEMIA: Primary | ICD-10-CM

## 2025-03-24 PROBLEM — J45.909 ASTHMA DUE TO SEASONAL ALLERGIES: Status: ACTIVE | Noted: 2025-03-24

## 2025-03-24 PROBLEM — F17.200 CURRENT SMOKER: Chronic | Status: RESOLVED | Noted: 2023-11-11 | Resolved: 2025-03-24

## 2025-03-24 PROBLEM — T78.40XA ALLERGIES: Status: ACTIVE | Noted: 2025-03-24

## 2025-03-24 PROBLEM — F43.10 POSTTRAUMATIC STRESS DISORDER: Status: RESOLVED | Noted: 2023-05-10 | Resolved: 2025-03-24

## 2025-03-24 PROBLEM — F32.9 MAJOR DEPRESSIVE DISORDER, SINGLE EPISODE, UNSPECIFIED: Status: RESOLVED | Noted: 2023-09-14 | Resolved: 2025-03-24

## 2025-03-24 PROCEDURE — 1126F AMNT PAIN NOTED NONE PRSNT: CPT | Performed by: NURSE PRACTITIONER

## 2025-03-24 PROCEDURE — 1160F RVW MEDS BY RX/DR IN RCRD: CPT | Performed by: NURSE PRACTITIONER

## 2025-03-24 PROCEDURE — 1159F MED LIST DOCD IN RCRD: CPT | Performed by: NURSE PRACTITIONER

## 2025-03-24 PROCEDURE — 99213 OFFICE O/P EST LOW 20 MIN: CPT | Performed by: NURSE PRACTITIONER

## 2025-03-24 NOTE — PROGRESS NOTES
Subjective        Tory Alston is a 65 y.o. female.     Chief Complaint   Patient presents with    Anxiety    Hyperlipidemia    Primary Care Follow-Up       Anxiety    Hyperlipidemia    Primary Care Follow-Up  Conditions present:  Anxiety and Hyperlipidemia    Patient is here for management of her chronic medical problems.  Hyperlipidemia, allergies. Declines mammogram    Hyperlipidemia . Taking atorvatatin 40 mg daily. No complaints .     Allergy symptomatic has this season.     Anxiety declines medication she is praying and feels since she got riid of SO she is much better.               The following portions of the patient's history were reviewed and updated as appropriate: allergies, current medications, past family history, past medical history, past social history, past surgical history and problem list.      Current Outpatient Medications:     atorvastatin (LIPITOR) 40 MG tablet, TAKE 1 TABLET BY MOUTH EVERY DAY, Disp: 90 tablet, Rfl: 0    Recent Results (from the past 24 weeks)   Lipid Panel    Collection Time: 11/23/24  9:02 AM    Specimen: Blood   Result Value Ref Range    Total Cholesterol 160 0 - 200 mg/dL    Triglycerides 61 0 - 150 mg/dL    HDL Cholesterol 67 (H) 40 - 60 mg/dL    LDL Cholesterol  81 0 - 100 mg/dL    VLDL Cholesterol 12 5 - 40 mg/dL    LDL/HDL Ratio 1.21    Comprehensive Metabolic Panel    Collection Time: 11/23/24  9:02 AM    Specimen: Blood   Result Value Ref Range    Glucose 81 65 - 99 mg/dL    BUN 17 8 - 23 mg/dL    Creatinine 0.89 0.57 - 1.00 mg/dL    Sodium 136 136 - 145 mmol/L    Potassium 3.9 3.5 - 5.2 mmol/L    Chloride 99 98 - 107 mmol/L    CO2 24.0 22.0 - 29.0 mmol/L    Calcium 9.3 8.6 - 10.5 mg/dL    Total Protein 7.0 6.0 - 8.5 g/dL    Albumin 4.4 3.5 - 5.2 g/dL    ALT (SGPT) 15 1 - 33 U/L    AST (SGOT) 20 1 - 32 U/L    Alkaline Phosphatase 101 39 - 117 U/L    Total Bilirubin 0.5 0.0 - 1.2 mg/dL    Globulin 2.6 gm/dL    A/G Ratio 1.7 g/dL    BUN/Creatinine Ratio 19.1 7.0  "- 25.0    Anion Gap 13.0 5.0 - 15.0 mmol/L    eGFR 72.1 >60.0 mL/min/1.73   Hemoglobin A1c    Collection Time: 11/23/24  9:02 AM    Specimen: Blood   Result Value Ref Range    Hemoglobin A1C 5.90 (H) 4.80 - 5.60 %         Review of Systems    Objective     /87   Pulse 74   Ht 175.3 cm (69.02\")   Wt 87.5 kg (193 lb)   SpO2 95%   BMI 28.49 kg/m²     Physical Exam  Vitals and nursing note reviewed.   Constitutional:       Appearance: Normal appearance.   HENT:      Head: Normocephalic.      Right Ear: Tympanic membrane normal.      Left Ear: Tympanic membrane normal.      Nose: Nose normal.      Mouth/Throat:      Mouth: Mucous membranes are moist.   Eyes:      Pupils: Pupils are equal, round, and reactive to light.   Cardiovascular:      Rate and Rhythm: Normal rate and regular rhythm.      Pulses: Normal pulses.      Heart sounds: Normal heart sounds.   Pulmonary:      Effort: Pulmonary effort is normal.      Breath sounds: Normal breath sounds.   Abdominal:      General: Bowel sounds are normal.      Palpations: Abdomen is soft.   Musculoskeletal:      Cervical back: Neck supple.   Skin:     General: Skin is warm.   Neurological:      General: No focal deficit present.      Mental Status: She is alert and oriented to person, place, and time.   Psychiatric:         Mood and Affect: Mood normal.         Thought Content: Thought content normal.         Result Review :                Assessment & Plan    Diagnoses and all orders for this visit:    1. Mixed hyperlipidemia (Primary)  Comments:  stable on current medications    2. Allergy, sequela  Comments:  stable symptomatic      Patient Instructions   Eat healthy exercise daily.     Follow Up   Return in about 6 months (around 9/24/2025).    Patient was given instructions and counseling regarding her condition or for health maintenance advice. Please see specific information pulled into the AVS if appropriate.     Margarita Erazo, APRN    03/24/25      "

## 2025-05-30 RX ORDER — ATORVASTATIN CALCIUM 40 MG/1
40 TABLET, FILM COATED ORAL DAILY
Qty: 90 TABLET | Refills: 0 | Status: SHIPPED | OUTPATIENT
Start: 2025-05-30

## 2025-08-28 RX ORDER — ATORVASTATIN CALCIUM 40 MG/1
40 TABLET, FILM COATED ORAL DAILY
Qty: 90 TABLET | Refills: 0 | Status: SHIPPED | OUTPATIENT
Start: 2025-08-28